# Patient Record
Sex: FEMALE | Race: WHITE | NOT HISPANIC OR LATINO | Employment: FULL TIME | URBAN - METROPOLITAN AREA
[De-identification: names, ages, dates, MRNs, and addresses within clinical notes are randomized per-mention and may not be internally consistent; named-entity substitution may affect disease eponyms.]

---

## 2018-06-25 ENCOUNTER — RECORDS - HEALTHEAST (OUTPATIENT)
Dept: LAB | Facility: CLINIC | Age: 18
End: 2018-06-25

## 2018-06-25 LAB
25(OH)D3 SERPL-MCNC: 23.8 NG/ML (ref 30–80)
VIT B12 SERPL-MCNC: 304 PG/ML (ref 213–816)

## 2018-06-26 LAB — C TRACH DNA SPEC QL PROBE+SIG AMP: NEGATIVE

## 2019-09-18 ENCOUNTER — RECORDS - HEALTHEAST (OUTPATIENT)
Dept: LAB | Facility: CLINIC | Age: 19
End: 2019-09-18

## 2019-09-18 LAB
CHOLEST SERPL-MCNC: 121 MG/DL
FASTING STATUS PATIENT QL REPORTED: NORMAL
HDLC SERPL-MCNC: 51 MG/DL
LDLC SERPL CALC-MCNC: 59 MG/DL
TRIGL SERPL-MCNC: 56 MG/DL

## 2019-09-19 LAB
25(OH)D3 SERPL-MCNC: 24.2 NG/ML (ref 30–80)
BKR LAB AP ABNORMAL BLEEDING: NO
BKR LAB AP BIRTH CONTROL/HORMONES: NORMAL
BKR LAB AP CERVICAL APPEARANCE: NORMAL
BKR LAB AP GYN ADEQUACY: NORMAL
BKR LAB AP GYN INTERPRETATION: NORMAL
BKR LAB AP HPV REFLEX: NORMAL
BKR LAB AP LMP: NORMAL
BKR LAB AP PATIENT STATUS: NORMAL
BKR LAB AP PREVIOUS ABNORMAL: NORMAL
BKR LAB AP PREVIOUS NORMAL: NORMAL
C TRACH DNA SPEC QL PROBE+SIG AMP: NEGATIVE
HIGH RISK?: NO
PATH REPORT.COMMENTS IMP SPEC: NORMAL
RESULT FLAG (HE HISTORICAL CONVERSION): NORMAL

## 2020-05-11 ENCOUNTER — VIRTUAL VISIT (OUTPATIENT)
Dept: FAMILY MEDICINE | Facility: OTHER | Age: 20
End: 2020-05-11

## 2020-05-12 NOTE — PROGRESS NOTES
"Date: 2020 09:39:30  Clinician: Gulilaume Gomez  Clinician NPI: [id number]  Patient: Samaria Hernandez  Patient : 2000  Patient Address: [address] [place] [address]  Patient Phone: [phone]  Visit Protocol: URI  Patient Summary:  Samaria is a 19 year old ( : 2000 ) female who initiated a Visit for COVID-19 (Coronavirus) evaluation and screening. When asked the question \"Please sign me up to receive news, health information and promotions. \", Samaria responded \"No\".    Samaria states her symptoms started gradually 3-4 days ago.   Her symptoms consist of myalgia, rhinitis, nasal congestion, ageusia, anosmia, and a headache.   Symptom details     Nasal secretions: The color of her mucus is yellow.    Headache: She states the headache is mild (1-3 on a 10 point pain scale).      Samaria denies having fever, facial pain or pressure, sore throat, cough, vomiting, nausea, teeth pain, diarrhea, ear pain, malaise, wheezing, enlarged lymph nodes, and chills. She also denies taking antibiotic medication for the symptoms, having a sinus infection within the past year, double sickening (worsening symptoms after initial improvement), and having recent facial or sinus surgery in the past 60 days. She is not experiencing dyspnea.   Precipitating events  She has not recently been exposed to someone with influenza. Samaria has been in close contact with the following high risk individuals: adults 65 or older and children under the age of 5.   Pertinent COVID-19 (Coronavirus) information  Samaria does not work or volunteer as healthcare worker or a  and does not work or volunteer in a healthcare facility.   She does not live with a healthcare worker.   Samaria has not had a close contact with a laboratory-confirmed COVID-19 patient within 14 days of symptom onset. She also has not had a close contact with a suspected COVID-19 patient within 14 days of symptom onset.   Pertinent medical history  Samaria does not get yeast " infections when she takes antibiotics.   Samaria does not need a return to work/school note.   Weight: 170 lbs   Samaria does not smoke or use smokeless tobacco.   She denies pregnancy and denies breastfeeding. She is currently menstruating.   Weight: 170 lbs    MEDICATIONS: No current medications, ALLERGIES: NKDA  Clinician Response:  Dear Samaria,   Dear Samaria  Your symptoms show that you may have coronavirus (COVID-19). This illness can cause fever, cough and trouble breathing. Many people get a mild case and get better on their own. Some people can get very sick.   Will I be tested for COVID-19?  Because we have limited testing supplies, we do not test everyone who is at low risk. We are testing if:    You are very ill. For example, you're on chemotherapy, dialysis or home hospice care. (Contact your specialty clinic or program.)   You live in a nursing home or other long-term care facility. (Talk to your nurse manager or medical director.)   You're a health care worker. ([facility] employees contact our employee health office for testing.)   We are performing limited curbside testing for healthcare/first responders and people with medical problems that put them at increased risk. It does not appear by the [facility] information you submitted that you meet any of these criteria. If there are medical problems that we did not know about, please repeat an [facility] visit and let us know what medical conditions you have.   How can I protect others?  Without a test, we can't know for sure that you have COVID-19. For safety, it's very important to follow these rules.  First, stay home and away from others (self-isolate) until:   You've had no fever---and no medicine that reduces fever---for 3 full days (72 hours). And...    Your other symptoms have gotten better. For example, your cough or breathing has improved. And...   At least 10 days have passed since your symptoms started.   During this time:   Don't go to work, school or  anywhere else.    Stay away from others in your home. No hugging, kissing or shaking hands.   Don't let anyone visit.   Cover your mouth and nose with a mask, tissue or wash cloth to avoid spreading germs.   Wash your hands and face often. Use soap and water.   How can I take care of myself?   1.Take Tylenol (acetaminophen) for fever or pain. If you have liver or kidney problems, ask your family doctor if it's okay to take Tylenol.        Adults can take either:    650 mg (two 325 mg pills) every 4 to 6 hours, or...   1,000 mg (two 500 mg pills) every 8 hours as needed.    Note: Don't take more than 3,000 mg in one day.   For children, check the Tylenol bottle for the right dose. The dose is based on the child's age or weight.   2.If you have other health problems (like cancer, heart failure, an organ transplant or severe kidney disease): Call your specialty clinic if you don't feel better in the next 2 days.       3.Know when to call 911: If your breathing is so bad that it keeps you from doing normal activities, call 911 or go to the emergency room. Tell them that you've been staying home and may have COVID-19.   Where can I get more information?  To learn more about COVID-19 and how to care for yourself at home, please visit the CDC website at https://www.cdc.gov/coronavirus/2019-ncov/about/steps-when-sick.html.  For more about your care at [facility], please visit [email or web address]   If you are interested in becoming part of a [facility] clinic trial related to COVID19 please go to [email or web address] for information, if you qualify.     Diagnosis: Acute upper respiratory infection, unspecified  Diagnosis ICD: J06.9

## 2021-03-01 LAB
ABO (EXTERNAL): NORMAL
BLD GP AB SCN SERPL QL: NEGATIVE
BLD GP AB SCN SERPL QL: NEGATIVE
HEMOGLOBIN: 14.5 G/DL
HEPATITIS B SURFACE ANTIGEN (EXTERNAL): NONREACTIVE
HIV1+2 AB SERPL QL IA: NONREACTIVE
PLATELET # BLD AUTO: 254 K/UL
PLATELET COUNT (EXTERNAL): 254 10E3/UL (ref 140–400)
RH (EXTERNAL): POSITIVE
RUBELLA ANTIBODY IGG (EXTERNAL): NORMAL
TREPONEMA PALLIDUM ANTIBODY (EXTERNAL): NONREACTIVE

## 2021-08-16 LAB — GROUP B STREPTOCOCCUS (EXTERNAL): POSITIVE

## 2021-08-17 ENCOUNTER — TRANSFERRED RECORDS (OUTPATIENT)
Dept: HEALTH INFORMATION MANAGEMENT | Facility: CLINIC | Age: 21
End: 2021-08-17

## 2021-09-03 ENCOUNTER — HOSPITAL ENCOUNTER (INPATIENT)
Facility: HOSPITAL | Age: 21
LOS: 3 days | Discharge: HOME | End: 2021-09-07
Attending: FAMILY MEDICINE | Admitting: FAMILY MEDICINE

## 2021-09-03 ENCOUNTER — HOSPITAL ENCOUNTER (OUTPATIENT)
Facility: HOSPITAL | Age: 21
Discharge: HOME | End: 2021-09-03
Attending: FAMILY MEDICINE | Admitting: FAMILY MEDICINE

## 2021-09-03 LAB — SARS-COV-2 RNA RESP QL NAA+PROBE: NEGATIVE

## 2021-09-03 PROCEDURE — 250N000013 HC RX MED GY IP 250 OP 250 PS 637: Performed by: FAMILY MEDICINE

## 2021-09-03 PROCEDURE — 87635 SARS-COV-2 COVID-19 AMP PRB: CPT | Performed by: FAMILY MEDICINE

## 2021-09-03 RX ORDER — MISOPROSTOL 100 UG/1
25 TABLET ORAL
Status: COMPLETED | OUTPATIENT
Start: 2021-09-03 | End: 2021-09-04

## 2021-09-03 RX ORDER — MORPHINE SULFATE 10 MG/ML
15 INJECTION, SOLUTION INTRAMUSCULAR; INTRAVENOUS
Status: DISCONTINUED | OUTPATIENT
Start: 2021-09-03 | End: 2021-09-06

## 2021-09-03 RX ORDER — VITAMIN B COMPLEX
1 TABLET ORAL DAILY
COMMUNITY

## 2021-09-03 RX ORDER — PRENATAL VIT/IRON FUM/FOLIC AC 27MG-0.8MG
1 TABLET ORAL DAILY
COMMUNITY

## 2021-09-03 RX ORDER — HYDROXYZINE HYDROCHLORIDE 50 MG/1
50 TABLET, FILM COATED ORAL
Status: DISCONTINUED | OUTPATIENT
Start: 2021-09-03 | End: 2021-09-06

## 2021-09-03 RX ADMIN — MISOPROSTOL 25 MCG: 100 TABLET ORAL at 23:05

## 2021-09-03 RX ADMIN — MISOPROSTOL 25 MCG: 100 TABLET ORAL at 21:11

## 2021-09-03 ASSESSMENT — MIFFLIN-ST. JEOR: SCORE: 1769.66

## 2021-09-03 NOTE — PROGRESS NOTES
Pt here for a covid test to be completed. Sample obtained and sent at this time. Samaria is to return this eventing for a scheduled induction.

## 2021-09-04 PROBLEM — O26.643 CHOLESTASIS DURING PREGNANCY IN THIRD TRIMESTER: Chronic | Status: ACTIVE | Noted: 2021-09-04

## 2021-09-04 PROBLEM — Z34.90 ENCOUNTER FOR INDUCTION OF LABOR: Status: ACTIVE | Noted: 2021-09-04

## 2021-09-04 LAB
ABO/RH(D): NORMAL
ANTIBODY SCREEN: NEGATIVE
HOLD SPECIMEN: NORMAL
HOLD SPECIMEN: NORMAL
SPECIMEN EXPIRATION DATE: NORMAL

## 2021-09-04 PROCEDURE — 250N000013 HC RX MED GY IP 250 OP 250 PS 637: Performed by: FAMILY MEDICINE

## 2021-09-04 PROCEDURE — 36415 COLL VENOUS BLD VENIPUNCTURE: CPT | Performed by: FAMILY MEDICINE

## 2021-09-04 PROCEDURE — 120N000001 HC R&B MED SURG/OB

## 2021-09-04 PROCEDURE — 86901 BLOOD TYPING SEROLOGIC RH(D): CPT | Performed by: FAMILY MEDICINE

## 2021-09-04 RX ORDER — NALOXONE HYDROCHLORIDE 0.4 MG/ML
0.2 INJECTION, SOLUTION INTRAMUSCULAR; INTRAVENOUS; SUBCUTANEOUS
Status: DISCONTINUED | OUTPATIENT
Start: 2021-09-04 | End: 2021-09-06 | Stop reason: HOSPADM

## 2021-09-04 RX ORDER — PROCHLORPERAZINE MALEATE 10 MG
10 TABLET ORAL EVERY 6 HOURS PRN
Status: DISCONTINUED | OUTPATIENT
Start: 2021-09-04 | End: 2021-09-06 | Stop reason: HOSPADM

## 2021-09-04 RX ORDER — OXYTOCIN/0.9 % SODIUM CHLORIDE 30/500 ML
100-340 PLASTIC BAG, INJECTION (ML) INTRAVENOUS CONTINUOUS PRN
Status: DISCONTINUED | OUTPATIENT
Start: 2021-09-04 | End: 2021-09-07 | Stop reason: HOSPADM

## 2021-09-04 RX ORDER — METHYLERGONOVINE MALEATE 0.2 MG/ML
200 INJECTION INTRAVENOUS
Status: DISCONTINUED | OUTPATIENT
Start: 2021-09-04 | End: 2021-09-06 | Stop reason: HOSPADM

## 2021-09-04 RX ORDER — METOCLOPRAMIDE 10 MG/1
10 TABLET ORAL EVERY 6 HOURS PRN
Status: DISCONTINUED | OUTPATIENT
Start: 2021-09-04 | End: 2021-09-06 | Stop reason: HOSPADM

## 2021-09-04 RX ORDER — KETOROLAC TROMETHAMINE 30 MG/ML
30 INJECTION, SOLUTION INTRAMUSCULAR; INTRAVENOUS
Status: DISCONTINUED | OUTPATIENT
Start: 2021-09-04 | End: 2021-09-07 | Stop reason: HOSPADM

## 2021-09-04 RX ORDER — FENTANYL CITRATE 50 UG/ML
50-100 INJECTION, SOLUTION INTRAMUSCULAR; INTRAVENOUS
Status: DISCONTINUED | OUTPATIENT
Start: 2021-09-04 | End: 2021-09-06 | Stop reason: HOSPADM

## 2021-09-04 RX ORDER — OXYTOCIN 10 [USP'U]/ML
10 INJECTION, SOLUTION INTRAMUSCULAR; INTRAVENOUS
Status: DISCONTINUED | OUTPATIENT
Start: 2021-09-04 | End: 2021-09-07 | Stop reason: HOSPADM

## 2021-09-04 RX ORDER — IBUPROFEN 600 MG/1
600 TABLET, FILM COATED ORAL
Status: DISCONTINUED | OUTPATIENT
Start: 2021-09-04 | End: 2021-09-07 | Stop reason: HOSPADM

## 2021-09-04 RX ORDER — MISOPROSTOL 200 UG/1
800 TABLET ORAL
Status: DISCONTINUED | OUTPATIENT
Start: 2021-09-04 | End: 2021-09-06 | Stop reason: HOSPADM

## 2021-09-04 RX ORDER — OXYTOCIN 10 [USP'U]/ML
10 INJECTION, SOLUTION INTRAMUSCULAR; INTRAVENOUS
Status: DISCONTINUED | OUTPATIENT
Start: 2021-09-04 | End: 2021-09-06 | Stop reason: HOSPADM

## 2021-09-04 RX ORDER — ONDANSETRON 2 MG/ML
4 INJECTION INTRAMUSCULAR; INTRAVENOUS EVERY 6 HOURS PRN
Status: DISCONTINUED | OUTPATIENT
Start: 2021-09-04 | End: 2021-09-06 | Stop reason: HOSPADM

## 2021-09-04 RX ORDER — NALOXONE HYDROCHLORIDE 0.4 MG/ML
0.4 INJECTION, SOLUTION INTRAMUSCULAR; INTRAVENOUS; SUBCUTANEOUS
Status: DISCONTINUED | OUTPATIENT
Start: 2021-09-04 | End: 2021-09-06 | Stop reason: HOSPADM

## 2021-09-04 RX ORDER — OXYTOCIN/0.9 % SODIUM CHLORIDE 30/500 ML
340 PLASTIC BAG, INJECTION (ML) INTRAVENOUS CONTINUOUS PRN
Status: DISCONTINUED | OUTPATIENT
Start: 2021-09-04 | End: 2021-09-06 | Stop reason: HOSPADM

## 2021-09-04 RX ORDER — ONDANSETRON 4 MG/1
4 TABLET, ORALLY DISINTEGRATING ORAL EVERY 6 HOURS PRN
Status: DISCONTINUED | OUTPATIENT
Start: 2021-09-04 | End: 2021-09-06 | Stop reason: HOSPADM

## 2021-09-04 RX ORDER — MISOPROSTOL 200 UG/1
400 TABLET ORAL
Status: DISCONTINUED | OUTPATIENT
Start: 2021-09-04 | End: 2021-09-06 | Stop reason: HOSPADM

## 2021-09-04 RX ORDER — PENICILLIN G 3000000 [IU]/50ML
3 INJECTION, SOLUTION INTRAVENOUS EVERY 4 HOURS
Status: DISCONTINUED | OUTPATIENT
Start: 2021-09-04 | End: 2021-09-06 | Stop reason: HOSPADM

## 2021-09-04 RX ORDER — CARBOPROST TROMETHAMINE 250 UG/ML
250 INJECTION, SOLUTION INTRAMUSCULAR
Status: DISCONTINUED | OUTPATIENT
Start: 2021-09-04 | End: 2021-09-06 | Stop reason: HOSPADM

## 2021-09-04 RX ORDER — METOCLOPRAMIDE HYDROCHLORIDE 5 MG/ML
10 INJECTION INTRAMUSCULAR; INTRAVENOUS EVERY 6 HOURS PRN
Status: DISCONTINUED | OUTPATIENT
Start: 2021-09-04 | End: 2021-09-06 | Stop reason: HOSPADM

## 2021-09-04 RX ORDER — PROCHLORPERAZINE 25 MG
25 SUPPOSITORY, RECTAL RECTAL EVERY 12 HOURS PRN
Status: DISCONTINUED | OUTPATIENT
Start: 2021-09-04 | End: 2021-09-06 | Stop reason: HOSPADM

## 2021-09-04 RX ORDER — ACETAMINOPHEN 325 MG/1
650 TABLET ORAL EVERY 4 HOURS PRN
Status: DISCONTINUED | OUTPATIENT
Start: 2021-09-04 | End: 2021-09-06 | Stop reason: HOSPADM

## 2021-09-04 RX ORDER — PENICILLIN G POTASSIUM 5000000 [IU]/1
5 INJECTION, POWDER, FOR SOLUTION INTRAMUSCULAR; INTRAVENOUS ONCE
Status: COMPLETED | OUTPATIENT
Start: 2021-09-04 | End: 2021-09-06

## 2021-09-04 RX ORDER — URSODIOL 300 MG/1
300 CAPSULE ORAL 2 TIMES DAILY
Status: ON HOLD | COMMUNITY
End: 2021-09-07

## 2021-09-04 RX ADMIN — MISOPROSTOL 25 MCG: 100 TABLET ORAL at 07:02

## 2021-09-04 RX ADMIN — MISOPROSTOL 25 MCG: 100 TABLET ORAL at 19:43

## 2021-09-04 RX ADMIN — MISOPROSTOL 25 MCG: 100 TABLET ORAL at 13:26

## 2021-09-04 RX ADMIN — MISOPROSTOL 25 MCG: 100 TABLET ORAL at 05:02

## 2021-09-04 RX ADMIN — MISOPROSTOL 25 MCG: 100 TABLET ORAL at 21:44

## 2021-09-04 RX ADMIN — MISOPROSTOL 25 MCG: 100 TABLET ORAL at 03:09

## 2021-09-04 RX ADMIN — HYDROXYZINE HYDROCHLORIDE 50 MG: 50 TABLET ORAL at 03:09

## 2021-09-04 RX ADMIN — MISOPROSTOL 25 MCG: 100 TABLET ORAL at 09:14

## 2021-09-04 RX ADMIN — MISOPROSTOL 25 MCG: 100 TABLET ORAL at 01:06

## 2021-09-04 RX ADMIN — MISOPROSTOL 25 MCG: 100 TABLET ORAL at 23:47

## 2021-09-04 RX ADMIN — MISOPROSTOL 25 MCG: 100 TABLET ORAL at 11:27

## 2021-09-04 RX ADMIN — HYDROXYZINE HYDROCHLORIDE 50 MG: 50 TABLET ORAL at 21:45

## 2021-09-04 ASSESSMENT — ENCOUNTER SYMPTOMS: ROS SKIN COMMENTS: ITCHING

## 2021-09-04 NOTE — PLAN OF CARE
[place] [facility] [place] due to cholestasis. SVE 1/30/-2. Cat I tracing with contractions q2-6 mins. Patient states they are not painful but feel like johan willett. Dr Rodríguez called regarding patient arrival and information above. Orders received for cervical ripening.

## 2021-09-04 NOTE — H&P
"Samaria Lowe is an 21 year old female, ,  presents for induction at 39w0d with prenatal care through Roselia Hensley MD.  Cervix was 1 cm dilated upon arrival. She has had cramping and some bloody show since Cytotec was started for cervical ripening last night around 9 pm.    Prenatal flowsheet and labs were reviewed.      Her prenatal course was complicated by cholestasis diagnosed at 37w1d with mildly elevated bile acids (13 umol/L) which were checked due to onset of intense itching.  She has taken ursodiol for relief.  Last bile acids level was 12 umol/L at 38w1d on 21.  Liver panel was normal except mild elevated alkaline phosphatase.    She is GBS positive.    History reviewed. No pertinent past medical history.    Allergies:   Allergies   Allergen Reactions     Thimerosal Rash       Active Problems:    Encounter for induction of labor    Cholestasis during pregnancy in third trimester    Blood pressure 128/60, pulse 88, temperature 98.4  F (36.9  C), temperature source Oral, resp. rate 18, height 1.676 m (5' 6\"), weight 99.8 kg (220 lb), SpO2 98 %, currently breastfeeding.    Review of Systems   Skin:        itching            Physical Exam  Constitutional:       Appearance: Normal appearance.   HENT:      Head: Normocephalic and atraumatic.      Nose: Nose normal.      Mouth/Throat:      Mouth: Mucous membranes are moist.   Cardiovascular:      Rate and Rhythm: Normal rate. Rhythm irregular.      Pulses: Normal pulses.      Heart sounds: Normal heart sounds.   Pulmonary:      Effort: Pulmonary effort is normal.      Breath sounds: Normal breath sounds.   Abdominal:      Comments: Gravid, cephalic position of fetus by palpation   Genitourinary:     General: Normal vulva.      Comments: Cervix 1/60%/-1/mid position/medium consistency  Musculoskeletal:      Cervical back: Normal range of motion and neck supple.   Skin:     General: Skin is warm and dry.   Neurological:      Mental Status: " She is alert and oriented to person, place, and time.   Psychiatric:         Mood and Affect: Mood normal.         Behavior: Behavior normal.      FHT's:  Category 1 tracing  [place]: q 1.5 - 2 minutes      Assessment:  Active Problems:    Encounter for induction of labor    Cholestasis during pregnancy in third trimester        Plan: Continue cervical ripening.        Deb Rodríguez MD  9/4/2021

## 2021-09-04 NOTE — PLAN OF CARE
Problem: Delayed Labor Progression (Labor)  Goal: Effective Progression to Delivery  Outcome: Improving     RN discussed plan for Cytotec induction with Samaria and  Faraz. All questions answered.

## 2021-09-05 PROBLEM — O99.820 GBS (GROUP B STREPTOCOCCUS CARRIER), +RV CULTURE, CURRENTLY PREGNANT: Chronic | Status: ACTIVE | Noted: 2021-09-05

## 2021-09-05 PROBLEM — R00.0 TACHYCARDIA: Status: ACTIVE | Noted: 2021-09-05

## 2021-09-05 PROCEDURE — 3E0P7VZ INTRODUCTION OF HORMONE INTO FEMALE REPRODUCTIVE, VIA NATURAL OR ARTIFICIAL OPENING: ICD-10-PCS | Performed by: FAMILY MEDICINE

## 2021-09-05 PROCEDURE — 120N000001 HC R&B MED SURG/OB

## 2021-09-05 PROCEDURE — 250N000013 HC RX MED GY IP 250 OP 250 PS 637: Performed by: FAMILY MEDICINE

## 2021-09-05 RX ORDER — MISOPROSTOL 100 UG/1
25 TABLET ORAL
Status: DISCONTINUED | OUTPATIENT
Start: 2021-09-05 | End: 2021-09-06

## 2021-09-05 RX ADMIN — MISOPROSTOL 25 MCG: 100 TABLET ORAL at 22:45

## 2021-09-05 RX ADMIN — DINOPROSTONE 10 MG: 10 INSERT VAGINAL at 02:09

## 2021-09-05 RX ADMIN — HYDROXYZINE HYDROCHLORIDE 50 MG: 50 TABLET ORAL at 20:50

## 2021-09-05 RX ADMIN — MISOPROSTOL 25 MCG: 100 TABLET ORAL at 18:42

## 2021-09-05 RX ADMIN — MISOPROSTOL 25 MCG: 100 TABLET ORAL at 20:50

## 2021-09-05 NOTE — PROGRESS NOTES
"OB ANTEPARTUM PROGRESS NOTE    IUP at 39w1d, admitted for IOL/Cervical ripening for cholestasis    SUBJECTIVE:  Patient feels fine but tired- she has been undergoing cervical ripening, most recently cervidil. She is not yet in labor.  Request for placement of Cook Catheter for mechanical cervical ripening.     OBJECTIVE:  /67   Pulse 88   Temp 98.4  F (36.9  C) (Oral)   Resp 16   Ht 1.676 m (5' 6\")   Wt 99.8 kg (220 lb)   SpO2 98%   Breastfeeding Yes   BMI 35.51 kg/m     Abd: gravid  NST: Category 1  CX: 1+/50%/soft/mid-post/-2/intact    Cook catheter placed and 60 ml placed into uterine balloon and 40 ml into vaginal balloon, with intention to add 20 to each balloon in about 30 min.   Pt tolerated procedure well    ASSESSMENT:  Cholestasis  Term Pregnancy  Need for cervical ripening    PLAN:  Cook Catheter with option to add PO cytotec    Yasmin Adam M.D., FACOG  [facility]   OFFICE: [phone]  "

## 2021-09-05 NOTE — PLAN OF CARE
Dr Rodríguez updated on patient status. Two doses of Cytotec held this afternoon due to tachy- stole. Contractions have now spaced after oral hydration and a tub bath. Plan to continue with the full course of Cytotec and perform SVE two hours after last dose. Plan discussed with Gisela and all questions answered.

## 2021-09-05 NOTE — PROGRESS NOTES
cervadil removed at 1410 as ordered. SVE as noted with minimal change. Dr Staples notified, plans to have Huber Catheter placed but would like [facility] to assess and place. Dr Bernabe was given [facility] pager number. MD did call into pt's room and discuss plan of care with her and she was agreeable. Placement will take occur after pt showers.

## 2021-09-05 NOTE — PROGRESS NOTES
Pt is now requesting that the Cook cath to be removed and/or pain medication. Dr Bernabe notified, will call into pt room to discuss options with her.

## 2021-09-05 NOTE — PROGRESS NOTES
"Day #2 Cervical ripening for induction of labor   Indication for induction:  Cholestasis of pregnancy  Gestational Age:  39w1d     SUBJECTIVE: She continues to have cramping, but overall is still comfortable. Nurse reports episodes of maternal tachycardia intermittent into 130's.    OBJECTIVE:   BP 99/68   Pulse 109   Temp 98.4  F (36.9  C) (Oral)   Resp 16   Ht 1.676 m (5' 6\")   Wt 99.8 kg (220 lb)   SpO2 98%   Breastfeeding Yes   BMI 35.51 kg/m     Alert, no apparent distress  EFM:  Category 1 150's  [place]: q 1.5 to 4 minutes  Maternal pulse 100's to 130's    ASSESSMENT:  Active Problems:    Encounter for induction of labor    Cholestasis during pregnancy in third trimester    Tachycardia    GBS (group B Streptococcus carrier), +RV culture, currently pregnant    PLAN:   Continue Cervidil protocol, then switch to Pitocin.  Continue to hold GBS prophylaxis until ROM or in labor.  Mild tachycardia - monitor and get EKG if rate 140's or higher.  "

## 2021-09-06 ENCOUNTER — ANESTHESIA (OUTPATIENT)
Dept: OBGYN | Facility: HOSPITAL | Age: 21
End: 2021-09-06

## 2021-09-06 ENCOUNTER — ANESTHESIA EVENT (OUTPATIENT)
Dept: OBGYN | Facility: HOSPITAL | Age: 21
End: 2021-09-06

## 2021-09-06 PROCEDURE — 258N000003 HC RX IP 258 OP 636: Performed by: FAMILY MEDICINE

## 2021-09-06 PROCEDURE — 250N000011 HC RX IP 250 OP 636: Performed by: ANESTHESIOLOGY

## 2021-09-06 PROCEDURE — 0HQ9XZZ REPAIR PERINEUM SKIN, EXTERNAL APPROACH: ICD-10-PCS | Performed by: FAMILY MEDICINE

## 2021-09-06 PROCEDURE — 3E0R3BZ INTRODUCTION OF ANESTHETIC AGENT INTO SPINAL CANAL, PERCUTANEOUS APPROACH: ICD-10-PCS | Performed by: ANESTHESIOLOGY

## 2021-09-06 PROCEDURE — 370N000003 HC ANESTHESIA WARD SERVICE

## 2021-09-06 PROCEDURE — 250N000009 HC RX 250: Performed by: FAMILY MEDICINE

## 2021-09-06 PROCEDURE — 00HU33Z INSERTION OF INFUSION DEVICE INTO SPINAL CANAL, PERCUTANEOUS APPROACH: ICD-10-PCS | Performed by: ANESTHESIOLOGY

## 2021-09-06 PROCEDURE — 120N000001 HC R&B MED SURG/OB

## 2021-09-06 PROCEDURE — 250N000013 HC RX MED GY IP 250 OP 250 PS 637: Performed by: FAMILY MEDICINE

## 2021-09-06 PROCEDURE — 250N000009 HC RX 250: Performed by: ANESTHESIOLOGY

## 2021-09-06 PROCEDURE — 722N000001 HC LABOR CARE VAGINAL DELIVERY SINGLE

## 2021-09-06 PROCEDURE — 250N000011 HC RX IP 250 OP 636: Performed by: FAMILY MEDICINE

## 2021-09-06 PROCEDURE — 250N000009 HC RX 250

## 2021-09-06 RX ORDER — LIDOCAINE HYDROCHLORIDE 20 MG/ML
SOLUTION OROPHARYNGEAL
Status: COMPLETED
Start: 2021-09-06 | End: 2021-09-06

## 2021-09-06 RX ORDER — MISOPROSTOL 200 UG/1
TABLET ORAL
Status: DISPENSED
Start: 2021-09-06 | End: 2021-09-07

## 2021-09-06 RX ORDER — ACETAMINOPHEN 325 MG/1
650 TABLET ORAL EVERY 4 HOURS PRN
Status: DISCONTINUED | OUTPATIENT
Start: 2021-09-06 | End: 2021-09-07 | Stop reason: HOSPADM

## 2021-09-06 RX ORDER — CARBOPROST TROMETHAMINE 250 UG/ML
250 INJECTION, SOLUTION INTRAMUSCULAR
Status: DISCONTINUED | OUTPATIENT
Start: 2021-09-06 | End: 2021-09-07 | Stop reason: HOSPADM

## 2021-09-06 RX ORDER — NALBUPHINE HYDROCHLORIDE 10 MG/ML
2.5-5 INJECTION, SOLUTION INTRAMUSCULAR; INTRAVENOUS; SUBCUTANEOUS EVERY 6 HOURS PRN
Status: DISCONTINUED | OUTPATIENT
Start: 2021-09-06 | End: 2021-09-07 | Stop reason: HOSPADM

## 2021-09-06 RX ORDER — OXYTOCIN/0.9 % SODIUM CHLORIDE 30/500 ML
340 PLASTIC BAG, INJECTION (ML) INTRAVENOUS CONTINUOUS PRN
Status: DISCONTINUED | OUTPATIENT
Start: 2021-09-06 | End: 2021-09-07 | Stop reason: HOSPADM

## 2021-09-06 RX ORDER — METHYLERGONOVINE MALEATE 0.2 MG/ML
200 INJECTION INTRAVENOUS
Status: DISCONTINUED | OUTPATIENT
Start: 2021-09-06 | End: 2021-09-07 | Stop reason: HOSPADM

## 2021-09-06 RX ORDER — BISACODYL 10 MG
10 SUPPOSITORY, RECTAL RECTAL DAILY PRN
Status: DISCONTINUED | OUTPATIENT
Start: 2021-09-06 | End: 2021-09-07 | Stop reason: HOSPADM

## 2021-09-06 RX ORDER — OXYTOCIN 10 [USP'U]/ML
10 INJECTION, SOLUTION INTRAMUSCULAR; INTRAVENOUS
Status: DISCONTINUED | OUTPATIENT
Start: 2021-09-06 | End: 2021-09-07 | Stop reason: HOSPADM

## 2021-09-06 RX ORDER — MISOPROSTOL 200 UG/1
400 TABLET ORAL
Status: DISCONTINUED | OUTPATIENT
Start: 2021-09-06 | End: 2021-09-07 | Stop reason: HOSPADM

## 2021-09-06 RX ORDER — LIDOCAINE HCL/EPINEPHRINE/PF 2%-1:200K
VIAL (ML) INJECTION PRN
Status: DISCONTINUED | OUTPATIENT
Start: 2021-09-06 | End: 2021-09-06

## 2021-09-06 RX ORDER — IBUPROFEN 800 MG/1
800 TABLET, FILM COATED ORAL EVERY 6 HOURS PRN
Status: DISCONTINUED | OUTPATIENT
Start: 2021-09-06 | End: 2021-09-07 | Stop reason: HOSPADM

## 2021-09-06 RX ORDER — LIDOCAINE HYDROCHLORIDE 10 MG/ML
INJECTION, SOLUTION EPIDURAL; INFILTRATION; INTRACAUDAL; PERINEURAL
Status: COMPLETED
Start: 2021-09-06 | End: 2021-09-06

## 2021-09-06 RX ORDER — EPHEDRINE SULFATE 50 MG/ML
5 INJECTION, SOLUTION INTRAMUSCULAR; INTRAVENOUS; SUBCUTANEOUS
Status: DISCONTINUED | OUTPATIENT
Start: 2021-09-06 | End: 2021-09-06 | Stop reason: HOSPADM

## 2021-09-06 RX ORDER — LIDOCAINE HYDROCHLORIDE AND EPINEPHRINE 15; 5 MG/ML; UG/ML
INJECTION, SOLUTION EPIDURAL PRN
Status: DISCONTINUED | OUTPATIENT
Start: 2021-09-06 | End: 2021-09-06

## 2021-09-06 RX ORDER — DOCUSATE SODIUM 100 MG/1
100 CAPSULE, LIQUID FILLED ORAL DAILY
Status: DISCONTINUED | OUTPATIENT
Start: 2021-09-06 | End: 2021-09-07 | Stop reason: HOSPADM

## 2021-09-06 RX ORDER — ONDANSETRON 4 MG/1
4 TABLET, ORALLY DISINTEGRATING ORAL EVERY 6 HOURS PRN
Status: DISCONTINUED | OUTPATIENT
Start: 2021-09-06 | End: 2021-09-06 | Stop reason: HOSPADM

## 2021-09-06 RX ORDER — MODIFIED LANOLIN
OINTMENT (GRAM) TOPICAL
Status: DISCONTINUED | OUTPATIENT
Start: 2021-09-06 | End: 2021-09-07 | Stop reason: HOSPADM

## 2021-09-06 RX ORDER — MISOPROSTOL 200 UG/1
800 TABLET ORAL
Status: DISCONTINUED | OUTPATIENT
Start: 2021-09-06 | End: 2021-09-07 | Stop reason: HOSPADM

## 2021-09-06 RX ORDER — OXYTOCIN 10 [USP'U]/ML
INJECTION, SOLUTION INTRAMUSCULAR; INTRAVENOUS
Status: DISCONTINUED
Start: 2021-09-06 | End: 2021-09-06 | Stop reason: WASHOUT

## 2021-09-06 RX ORDER — HYDROCORTISONE 2.5 %
CREAM (GRAM) TOPICAL 3 TIMES DAILY PRN
Status: DISCONTINUED | OUTPATIENT
Start: 2021-09-06 | End: 2021-09-07 | Stop reason: HOSPADM

## 2021-09-06 RX ORDER — ONDANSETRON 2 MG/ML
4 INJECTION INTRAMUSCULAR; INTRAVENOUS EVERY 6 HOURS PRN
Status: DISCONTINUED | OUTPATIENT
Start: 2021-09-06 | End: 2021-09-06 | Stop reason: HOSPADM

## 2021-09-06 RX ADMIN — ONDANSETRON 4 MG: 2 INJECTION INTRAMUSCULAR; INTRAVENOUS at 16:07

## 2021-09-06 RX ADMIN — Medication 340 ML/HR: at 18:06

## 2021-09-06 RX ADMIN — KETOROLAC TROMETHAMINE 30 MG: 30 INJECTION, SOLUTION INTRAMUSCULAR at 18:05

## 2021-09-06 RX ADMIN — Medication: at 20:12

## 2021-09-06 RX ADMIN — LIDOCAINE HYDROCHLORIDE 20 ML: 10 INJECTION, SOLUTION EPIDURAL; INFILTRATION; INTRACAUDAL; PERINEURAL at 17:59

## 2021-09-06 RX ADMIN — ACETAMINOPHEN 650 MG: 325 TABLET ORAL at 10:36

## 2021-09-06 RX ADMIN — DOCUSATE SODIUM 100 MG: 100 CAPSULE, LIQUID FILLED ORAL at 20:13

## 2021-09-06 RX ADMIN — MISOPROSTOL 25 MCG: 100 TABLET ORAL at 12:06

## 2021-09-06 RX ADMIN — MISOPROSTOL 25 MCG: 100 TABLET ORAL at 05:05

## 2021-09-06 RX ADMIN — MISOPROSTOL 25 MCG: 100 TABLET ORAL at 07:04

## 2021-09-06 RX ADMIN — Medication: at 14:27

## 2021-09-06 RX ADMIN — PENICILLIN G 3 MILLION UNITS: 3000000 INJECTION, SOLUTION INTRAVENOUS at 17:27

## 2021-09-06 RX ADMIN — MISOPROSTOL 25 MCG: 100 TABLET ORAL at 02:48

## 2021-09-06 RX ADMIN — LIDOCAINE HYDROCHLORIDE,EPINEPHRINE BITARTRATE 9 ML: 20; .005 INJECTION, SOLUTION EPIDURAL; INFILTRATION; INTRACAUDAL; PERINEURAL at 14:44

## 2021-09-06 RX ADMIN — SODIUM CHLORIDE, POTASSIUM CHLORIDE, SODIUM LACTATE AND CALCIUM CHLORIDE 1000 ML: 600; 310; 30; 20 INJECTION, SOLUTION INTRAVENOUS at 13:51

## 2021-09-06 RX ADMIN — PENICILLIN G POTASSIUM 5 MILLION UNITS: 5000000 POWDER, FOR SOLUTION INTRAMUSCULAR; INTRAPLEURAL; INTRATHECAL; INTRAVENOUS at 13:52

## 2021-09-06 RX ADMIN — MISOPROSTOL 25 MCG: 100 TABLET ORAL at 09:04

## 2021-09-06 RX ADMIN — LIDOCAINE HYDROCHLORIDE 15 ML: 20 SOLUTION ORAL; TOPICAL at 17:59

## 2021-09-06 RX ADMIN — MISOPROSTOL 25 MCG: 100 TABLET ORAL at 00:45

## 2021-09-06 RX ADMIN — LIDOCAINE HYDROCHLORIDE,EPINEPHRINE BITARTRATE 3 ML: 15; .005 INJECTION, SOLUTION EPIDURAL; INFILTRATION; INTRACAUDAL; PERINEURAL at 14:40

## 2021-09-06 RX ADMIN — WITCH HAZEL: 500 SOLUTION RECTAL; TOPICAL at 20:12

## 2021-09-06 NOTE — ANESTHESIA PROCEDURE NOTES
Epidural catheter Procedure Note  Pre-Procedure   Staff -        Anesthesiologist:  Ranjith Yoon MD       Performed By: anesthesiologist       Location: OB       Procedure Start/Stop Times: 9/6/2021 2:19 PM and 9/6/2021 2:48 PM       Pre-Anesthestic Checklist: patient identified, IV checked, risks and benefits discussed, informed consent, monitors and equipment checked and pre-op evaluation  Timeout:       Correct Patient: Yes        Correct Procedure: Yes        Correct Site: Yes        Correct Position: Yes   Procedure Documentation  Procedure: epidural catheter       Patient Position: sitting       Patient Prep/Sterile Barriers: sterile gloves, mask, patient draped       Skin prep: Chloraprep      Local skin infiltrated with mL of 1% lidocaine.        Insertion Site: L3-4. (midline approach).       Technique: LORT saline        LIZ at 8 cm.       Needle Gauge: 18.        Needle Length (Inches): 3.5        Catheter: 20 G.         Catheter threaded easily.         5 cm epidural space.         Threaded 13 cm at skin.         # of attempts: 2 and  # of redirects:  2    Assessment/Narrative         Paresthesias: No.     Test dose of mL lidocaine 1.5% w/ 1:200,000 epinephrine at.         Test dose negative, 3 minutes after injection, for signs of intravascular, subdural, or intrathecal injection.       Insertion/Infusion Method: LORT saline       Aspiration negative for Heme or CSF via Epidural Catheter.

## 2021-09-06 NOTE — PROGRESS NOTES
Pt offered cervical exam given ctx and very light vaginal bleeding. Pt declined, prefers to wait until MD rounds today. Pt also declined the saline lock I offered at 1900; she preferes to wait until active labor.

## 2021-09-06 NOTE — PROGRESS NOTES
Summary of pt cares since I took over at 1900 (late note due to acuity on unit). Samaria taking misoprostol for cervical ripening per orders. Fetus active and category I tracing, however have had to adjust frequently and tracing at times discontinuous  Due to patient moving, eating, visiting BR. Pt had light bloody show that has since dissipated. MD Adam was notified of this per pt request. Aqua K pad set up for pt comfort, atarax given, pt appears to be comfortably sleeping.

## 2021-09-06 NOTE — L&D DELIVERY NOTE
OB Vaginal Delivery Note    Samaria Lowe MRN# [id number]   Age: 21 year old YOB: 2000       GA: 39w2d  GP:   Labor Complications: None   EBL:   mL  Delivery QBL: 250 mL  Delivery Type: Vaginal, Spontaneous   ROM to Delivery Time: (Delivered) Hours: 1 Minutes: 5  [place] Weight:     1 Minute 5 Minute 10 Minute   Apgar Totals:            RESHMA LARA;BEAR LANDAVERDE;FELIPE MCDONALD;DAMIEN MASON     Delivery Details:  Samaria Lowe, a 21 year old  female delivered a viable infant with apgars of   and  . Patient was fully dilated and pushing after   hours   minutes in active labor. Delivery was via vaginal, spontaneous  to a sterile field under epidural;local  anesthesia. Infant delivered in   middle  occiput  anterior  position. Anterior and posterior shoulders delivered without difficulty. The cord was clamped, cut twice and 3 vessels  were noted. Cord blood was obtained in routine fashion with the following disposition: lab .      Cord complications: nuchal   Placenta delivered at 2021  5:57 PM . Placental disposition was Hospital disposal . Fundal massage performed and fundus found to be firm.     Episiotomy: none    Perineum, vagina, cervix were inspected, and the following lacerations were noted:   Perineal lacerations: 1st                Any lacerations were repaired in the usual fashion using 3-0 Vicryl.    Excellent hemostasis was noted. Needle count correct. Infant and patient in delivery room in good and stable condition.        [place] Female-Samaria [[id number]]    Labor Event Times    Labor onset date: 21 Onset time:  7:00 AM   Dilation complete date: 21 Complete time:  5:07 PM   Start pushing date/time: 2021 1710      Labor Events     labor?: No   steroids: None  Labor Type: Induction/Cervical ripening  Predominate monitoring during 1st stage: continuous electronic fetal monitoring     Antibiotics received during labor?: Yes  Reason for  Antibiotics: GBS  Antibiotics received for GBS: Penicillin  Antibiotics Given (GBS): Greater than 4 hours prior to delivery     Rupture date/time: 21 1646   Rupture type: Spontaneous rupture of membranes occuring during spontaneous labor or augmentation  Fluid color: Meconium  Fluid odor: Normal     Induction: Misoprostol, Cervidil, Mechanical ripening agent  Induction date/time:     Cervical ripening date/time: 9/3/21 2000   Indications for induction: Elective     Augmentation: None  1:1 continuous labor support provided by?: RN Labor partogram used?: no      Delivery/Placenta Date and Time    Delivery Date: 21 Delivery Time:  5:51 PM   Placenta Date/Time: 2021  5:57 PM  Oxytocin given at the time of delivery: after delivery of placenta  Delivering clinician: Deb Rodríguez MD   Other personnel present at delivery:  Provider Role   Christine Jose RN Registered Nurse   Katie Miles RN Registered Nurse   Jeff, Susan JOSEPH RN Registered Nurse   [place], Cheryl Nurse Practitioner         Vaginal Counts     Initial count performed by 2 team members:  Two Team Members   Dr Anne Jose RN       [place] Suture Needles Sponges (RETIRED) Instruments   Initial counts 1 1 5    Added to count       Relief counts       Final counts 1 1 5          Placed during labor Accounted for at the end of labor   FSE No NA   IUPC No NA   Cervadil Yes Yes              Final count performed by 2 team members:  Two Team Members   Dr Anne Jose RN      Final count correct?: Yes     Cord    Vessels: 3 Vessels    Cord Complications: Nuchal   Nuchal Intervention: delivered through         Nuchal cord description: tight nuchal cord         Cord Blood Disposition: Lab    Gases Sent?: No    Delayed cord clamping?: Yes    Cord Clamping Delay (seconds):  seconds    Stem cell collection?: No       [place] Resuscitation    Methods: None     Labor Events and Shoulder Dystocia    Fetal Tracing Prior to  Delivery: Category 1  Shoulder dystocia present?: Neg     Delivery (Maternal) (Provider to Complete) ([id number])    Episiotomy: None  Perineal lacerations: 1st Repaired?: Yes   Repair suture: 3-0 Vicryl  Number of repair packets: 1  Genital tract inspection done: Pos     Blood Loss  Mother: Samaria Lowe #[id number]   Start of Mother's Information    Delivery Blood Loss  09/06/21 0700 - 09/06/21 1853    Delivery QBL (mL) Hospital Encounter 250 mL    Total  250 mL         End of Mother's Information  Mother: Samaria Lowe #[id number]          Delivery - Provider to Complete ([id number])    Delivering clinician: Deb Rodríguez MD  Delivery Type (Choose the 1 that will go to the Birth History): Vaginal, Spontaneous                   Other personnel:  Provider Role   Christine Jose RN Registered Nurse   Katie Miles RN Registered Nurse   Susan Aguilar RN Registered Nurse   Cheryl Mcgowan Nurse Practitioner                Placenta    Date/Time: 9/6/2021  5:57 PM  Removal: Spontaneous  Disposition: Hospital disposal           Anesthesia    Method: Epidural, Local                Presentation and Position    Position: Middle Occiput Anterior                 Deb Rodríguez MD

## 2021-09-06 NOTE — PLAN OF CARE
Cat I EFM tracing though discontinuous at times due to pt movement. Pt restful and sleeping comfortable with atarax and aqua K pad. Continuing with cervical ripening after pt was unable to tolerate cook catheter  Problem: Change in Fetal Wellbeing (Labor)  Goal: Stable Fetal Wellbeing  Outcome: No Change

## 2021-09-06 NOTE — ANESTHESIA PREPROCEDURE EVALUATION
Anesthesia Pre-Procedure Evaluation    Patient: Samaria Lowe   MRN: [id number] : 2000        Preoperative Diagnosis: * No pre-op diagnosis entered *   Procedure : * No procedures listed *     History reviewed. No pertinent past medical history.   History reviewed. No pertinent surgical history.   Allergies   Allergen Reactions     Thimerosal Rash      Social History     Tobacco Use     Smoking status: Never Smoker     Smokeless tobacco: Never Used   Substance Use Topics     Alcohol use: Not Currently      Wt Readings from Last 1 Encounters:   21 99.8 kg (220 lb)        Anesthesia Evaluation   Pt has not had prior anesthetic         ROS/MED HX  ENT/Pulmonary:       Neurologic:  - neg neurologic ROS     Cardiovascular:    (-) PIH   METS/Exercise Tolerance:     Hematologic: Comments: Cholestasis during pregnancy, mild.      Musculoskeletal:       GI/Hepatic:  - neg GI/hepatic ROS     Renal/Genitourinary:       Endo:     (+) Obesity,     Psychiatric/Substance Use:  - neg psychiatric ROS     Infectious Disease:       Malignancy:       Other:     (-) previous  and TOLAC candidate       Physical Exam    Airway        Mallampati: III    Neck ROM: full     Respiratory Devices and Support         Dental           Cardiovascular             Pulmonary                   OUTSIDE LABS:  CBC:   Lab Results   Component Value Date    HGB 14.5 2021     BMP: No results found for: NA, POTASSIUM, CHLORIDE, CO2, BUN, CR, GLC  COAGS: No results found for: PTT, INR, FIBR  POC: No results found for: BGM, HCG, HCGS  HEPATIC: No results found for: ALBUMIN, PROTTOTAL, ALT, AST, GGT, ALKPHOS, BILITOTAL, BILIDIRECT, PARKER  OTHER: No results found for: PH, LACT, A1C, ALIYA, PHOS, MAG, LIPASE, AMYLASE, TSH, T4, T3, CRP, SED    Anesthesia Plan    ASA Status:  3      Anesthesia Type: Epidural.              Consents    Anesthesia Plan(s) and associated risks, benefits, and realistic alternatives discussed. Questions  answered and patient/representative(s) expressed understanding.     - Discussed with:  Patient         Postoperative Care            Comments:    ALTAGRACIA Yoon MD

## 2021-09-07 VITALS
HEIGHT: 66 IN | HEART RATE: 88 BPM | RESPIRATION RATE: 16 BRPM | DIASTOLIC BLOOD PRESSURE: 64 MMHG | SYSTOLIC BLOOD PRESSURE: 118 MMHG | TEMPERATURE: 98.2 F | WEIGHT: 220 LBS | OXYGEN SATURATION: 96 % | BODY MASS INDEX: 35.36 KG/M2

## 2021-09-07 PROBLEM — R00.0 TACHYCARDIA: Status: RESOLVED | Noted: 2021-09-05 | Resolved: 2021-09-07

## 2021-09-07 PROBLEM — O26.643 CHOLESTASIS DURING PREGNANCY IN THIRD TRIMESTER: Chronic | Status: RESOLVED | Noted: 2021-09-04 | Resolved: 2021-09-07

## 2021-09-07 PROBLEM — Z34.90 ENCOUNTER FOR INDUCTION OF LABOR: Status: RESOLVED | Noted: 2021-09-04 | Resolved: 2021-09-07

## 2021-09-07 PROBLEM — O99.820 GBS (GROUP B STREPTOCOCCUS CARRIER), +RV CULTURE, CURRENTLY PREGNANT: Chronic | Status: RESOLVED | Noted: 2021-09-05 | Resolved: 2021-09-07

## 2021-09-07 PROCEDURE — 250N000013 HC RX MED GY IP 250 OP 250 PS 637: Performed by: FAMILY MEDICINE

## 2021-09-07 RX ORDER — IBUPROFEN 200 MG
800 TABLET ORAL EVERY 6 HOURS PRN
Status: ON HOLD | COMMUNITY
Start: 2021-09-07 | End: 2023-05-26

## 2021-09-07 RX ORDER — ACETAMINOPHEN 325 MG/1
650 TABLET ORAL EVERY 4 HOURS PRN
COMMUNITY
Start: 2021-09-07

## 2021-09-07 RX ADMIN — Medication: at 00:50

## 2021-09-07 RX ADMIN — IBUPROFEN 800 MG: 800 TABLET, FILM COATED ORAL at 00:50

## 2021-09-07 RX ADMIN — WITCH HAZEL 1 EACH: 500 SOLUTION RECTAL; TOPICAL at 18:15

## 2021-09-07 RX ADMIN — IBUPROFEN 800 MG: 800 TABLET, FILM COATED ORAL at 13:21

## 2021-09-07 RX ADMIN — Medication 1 APPLICATOR: at 18:15

## 2021-09-07 RX ADMIN — ACETAMINOPHEN 650 MG: 325 TABLET ORAL at 06:52

## 2021-09-07 RX ADMIN — IBUPROFEN 800 MG: 800 TABLET, FILM COATED ORAL at 06:51

## 2021-09-07 RX ADMIN — ACETAMINOPHEN 650 MG: 325 TABLET ORAL at 00:50

## 2021-09-07 NOTE — LACTATION NOTE
This note was copied from a baby's chart.  Lactation to patient room to assess breastfeeding per patient request. Mom is using cross cradle hold and states nipple pain 3/10 with initial latch bilaterally but pain subsides. Compression stripe on R nipple noted after feeding; assisted with asymmetrical latch on L side and nipple not creased after feeding. Instructed on use and care of gel pads for nipple pain; mom states understanding.

## 2021-09-07 NOTE — DISCHARGE SUMMARY
[facility]    Discharge Summary  Obstetrics    Date of Admission:  9/3/2021  Date of Discharge:  2021   Discharging Provider: Deb Rodríguez    Discharge Diagnoses   Principal Problem:    Normal delivery  Resolved Problems:    Encounter for induction of labor    Cholestasis during pregnancy in third trimester    Tachycardia    GBS (group B Streptococcus carrier), +RV culture, currently pregnant        History of Present Illness   Samaria Lowe is a 21 year old female who presented with a plan to induce labor for cholestasis of pregnancy.    Hospital Course   The patient's hospital course was remarkable for a long course of cervical ripening with 12 doses then 2 dose of cervidil, a brief trial of mechanical dilation with a Cook catheter, then 9 more doses of Cervidil. She then had a normal labor and delivery with an epidural. She recovered as anticipated and experienced no post-delivery complications.  On discharge, her pain was well controlled. Vaginal bleeding is similar to peak menstrual flow.  Voiding without difficulty.  Ambulating well and tolerating a normal diet.  No fevers.  Breastfeeding well.  Infant is stable.  She was discharged on post-partum day #1..    Post-partum hemoglobin:   Hemoglobin   Date Value Ref Range Status   2021 14.5 11.7 - 15.5 g/dL Final       [place] Depression Scale  Thoughts of Harming Self: 0-->never  Total Score: 5      Deb Rodríguez MD    Discharge Disposition   Discharged to home   Condition at discharge: Good    Primary Care Physician   ASHLEIGH LEONARD    Consultations This Hospital Stay   OB GYN IP CONSULT  LACTATION IP CONSULT  HOME CARE POST PARTUM/ IP CONSULT  LACTATION IP CONSULT    Discharge Orders      Lactation Referral      Activity    Activity as tolerated     Reason for your hospital stay    Maternity care     Follow Up    Follow up with Ashleigh Leonard MD in 6 weeks for post-delivery check     Breast pump  - Manual/Electric    Breast Pump Documentation:  Manual/Electric Pump: To support adequate breast milk production and nutrition for infant.     I, the undersigned, certify that the above prescribed supplies are medically necessary for this patient and is both reasonable and necessary in reference to accepted standards of medical and necessary in reference to accepted standards of medical practice in the treatment of this patient's condition and is not prescribed as a convenience.     Breast Pump DME    Breast Pump Documentation:   Manual/Electric Pump: To support adequate breast milk production and nutrition for infant.     I, the undersigned, certify that the above prescribed supplies are medically necessary for this patient and is both reasonable and necessary in reference to accepted standards of medical and necessary in reference to accepted standards of medical practice in the treatment of this patient's condition and is not prescribed as a convenience.     Diet    Resume previous diet     Discharge Medications   Current Discharge Medication List      START taking these medications    Details   acetaminophen (TYLENOL) 325 MG tablet Take 2 tablets (650 mg) by mouth every 4 hours as needed for mild pain or fever (greater than or equal to 38  C /100.4  F (oral) or 38.5  C/ 101.4  F (core).)    Associated Diagnoses: Normal delivery      ibuprofen (ADVIL/MOTRIN) 200 MG tablet Take 4 tablets (800 mg) by mouth every 6 hours as needed for other (cramping)    Associated Diagnoses: Normal delivery         CONTINUE these medications which have NOT CHANGED    Details   Prenatal Vit-Fe Fumarate-FA (PRENATAL MULTIVITAMIN W/IRON) 27-0.8 MG tablet Take 1 tablet by mouth daily      Vitamin D3 (CHOLECALCIFEROL) 25 mcg (1000 units) tablet Take 1 tablet by mouth daily         STOP taking these medications       ursodiol (ACTIGALL) 300 MG capsule Comments:   Reason for Stopping:             Allergies   Allergies   Allergen  Reactions     Thimerosal Rash

## 2021-09-07 NOTE — PROGRESS NOTES
"Postpartum Progress Note:  Vaginal delivery  Day 1    SUBJECTIVE: Samaria Lowe is a 21 year old female  who   is feeling well.  Pain is well controlled. She is eating, ambulating and voiding without difficulty.  She is breast feeding.  She has no concerns. Baby is well.    OBJECTIVE:   /63   Pulse 98   Temp 97.9  F (36.6  C) (Oral)   Resp 16   Ht 1.676 m (5' 6\")   Wt 99.8 kg (220 lb)   SpO2 96%   Breastfeeding Yes   BMI 35.51 kg/m      Alert no apparent distress   Fundus is firm 2 fingerbreadths below umbilicus  Extremities: edema trace     Lab Results   Component Value Date    HGB 14.5 2021         ASSESSMENT:    Principal Problem:    Normal delivery  Resolved Problems:    Encounter for induction of labor    Cholestasis during pregnancy in third trimester    Tachycardia    GBS (group B Streptococcus carrier), +RV culture, currently pregnant        Condition:  good    PLAN:    Continue current care.  Anticipate discharge this evening.    Deb Rodríguez MD  2021 2:16 PM     "

## 2021-09-07 NOTE — PLAN OF CARE
Problem: Adjustment to Role Transition (Postpartum Vaginal Delivery)  Goal: Successful Maternal Role Transition  Outcome: Improving  Intervention: Support Maternal Role Transition     Problem: Bleeding (Postpartum Vaginal Delivery)  Goal: Hemostasis  Outcome: Improving     Problem: Infection (Postpartum Vaginal Delivery)  Goal: Absence of Infection Signs and Symptoms  Outcome: Improving     Problem: Pain (Postpartum Vaginal Delivery)  Goal: Acceptable Pain Control  Outcome: Improving  Intervention: Prevent or Manage Pain     Problem: Urinary Retention (Postpartum Vaginal Delivery)  Goal: Effective Urinary Elimination  Outcome: Improving    Patient's vital signs are within normal limits. Patient ambulates independently and participates in infant cares and feeds. Patient states pain is adequately controlled with PRN Motrin and Tylenol.

## 2021-09-07 NOTE — PLAN OF CARE
Problem: Infection (Postpartum Vaginal Delivery)  Goal: Absence of Infection Signs and Symptoms  Outcome: No Change     Problem: Pain (Postpartum Vaginal Delivery)  Goal: Acceptable Pain Control  Outcome: No Change     Problem: Urinary Retention (Postpartum Vaginal Delivery)  Goal: Effective Urinary Elimination  Outcome: No Change   Patient is taking PRN ibuprofen for perineum pain with reported good relief.  Patient also encouraged to soak in tub 1-2 times a day.  Tucks pads and benzocaine spray applied to bottom area independently.  Will continue to monitor and assist as needed.

## 2021-09-07 NOTE — CONSULTS
Lactation consultant to patient room to assess breastfeeding per consult request.    Reviewed benefit of skin to skin prior to feeding to help get baby ready for feeding, importance of feeding baby on early hunger cues, and breastfeeding 8-12 times in 24 hours for optimal infant nutrition and hydration as well as for building an optimal milk supply.  Education given regarding importance of optimal positioning for deep, comfortable latch and effective milk transfer.     Mom has been nursing in cradle hold position, so gave hands on help with cross cradle hold. Infant rhythmically sucking and swallowing occasionally on L breast. Instructed on  breast compression and other strategies to keep baby awake and busy at the breast.    Physician to room to assess mom, so encouraged mom to finish feeding on R breast, and call me to return for next feeding to assess as well as answer questions. Mom states understanding.

## 2021-09-07 NOTE — ANESTHESIA POSTPROCEDURE EVALUATION
Patient: Samaria E Capistrant    * No procedures listed *    Diagnosis:* No pre-op diagnosis entered *  Diagnosis Additional Information: No value filed.    Anesthesia Type:  No value filed.    Note:  Disposition: Inpatient   Postop Pain Control: Uneventful            Sign Out: Well controlled pain   PONV:    Neuro/Psych: Uneventful            Sign Out: Acceptable/Baseline neuro status   Airway/Respiratory: Uneventful            Sign Out: Acceptable/Baseline resp. status   CV/Hemodynamics: Uneventful            Sign Out: Acceptable CV status; No obvious hypovolemia; No obvious fluid overload   Other NRE: NONE   DID A NON-ROUTINE EVENT OCCUR? No    Event details/Postop Comments:  LE worked great!!  Delivered shortly after LE placement.  No complaints.           Last vitals:  Vitals Value Taken Time   BP     Temp     Pulse     Resp     SpO2         Electronically Signed By: Ranjith Yoon MD  September 7, 2021  7:01 AM

## 2021-09-07 NOTE — PROGRESS NOTES
"Outreach Note for [facility]    Samaria Lowe  [id number]  2000    Chart reviewed, discharge follow-up plan discussed with patient's bedside RN, needs assessed. If able, patient, Samaria, would like to discharge this evening after  24hr testing. Post-delivery check appointment with  planned in 6 weeks at [facility], patient will schedule as instructed. Samaria is reported to have support at home, has baby care essentials, and feels ready to discharge later today with , \"Sabrina Mera\".      No additional needs identified at this time. Outreach nurse will continue to follow and assist with discharge planning as needed.           "

## 2021-09-27 NOTE — PROGRESS NOTES
"Assessment:   1.  Three week old infant with slow weight gain:  3 oz below birthweight, but gaining well since last visit  2.  Good latch and suck but low milk transfer in office today--in need of continued supplementation  3.  Mother with low milk supply, indeterminate cause  4.  Mother with depression, interested in therapy    Plan:   1.  Use good positioning for deep latch, with baby held close to body and baby's head/shoulders/hips in good alignment.  When in a seated position, use a pillow to help bring baby close to breasts, and stepstool to elevate your knees above hips.   2.  Present breast in the \"sandwich\" hold, compressing breast vertically and in line with baby's mouth, for baby to get a larger mouthful of breast and a deeper latch.   3.  Sabrina needs about 22 oz of milk each day to grow well and catch up on her growth.  If she nurses at home as she did in the office today, about 10 times/day, she needs about 14 oz per day in supplementation, using your breastmilk as your first choice and formula when the supply of pumped milk runs out.  You can give this after feedings, or distributed throughout the day according to her feeding cues.  4.   When giving bottles, use the paced bottlefeeding method.  You could also use a tube at the breast for supplementing if you like.  4.  You can continue pumping as you have been, using a little heat and massage on your breasts as you pump to help bring more milk.  You can also use a little coconut oil or olive oil inside your pump flanges if it helps with comfort.  5.  You can add some dietary supplements for yourself if you like;  Jhonny has some research behind it as being helpful for increasing milk supply.  Given written information.  Discussed potential option of Reglan, although Saamria shares she has had some depression in the past, so likely not a good option for her.  6. Know that given breastfeeding challenges, although some families pursue all avenues and " "some wean to formula entirely, there are many other options for coping. These can include decreasing pumping efforts and using formula for supplementation, setting a time boundary on continuing to \"triple feed,\" or moving to exclusive pumping.  Breastfeeding does not need to be a black-and-white choice, and you can consider what works best for your family.    7.  Formula has cow's milk protein in it just like your milk, so your milk is a better choice than formula in that regard.  You can continue to eliminate dairy to see if Sabrina does better, or you could add some back and see how she is.  Most of the time milk intolerance results in vomiting, skin problems and/or blood in the stool.  8.  If you decide to stop breastfeeding and use formula, decrease your pumping slowly so that breasts do not become engorged.  Discussed how to drop back gradually.  9. Discussed depression--given Post[place] Support [place] resource, which has list of therapists.  Reassured that if medication is needed, there are options that are safe in breastfeeding.  10.  See Dr. Hensley as planned, and lactation as needed.        Subjective: Samaria is here today because of poor weight gain in baby Sabrina and concerns about low milk supply--reports that baby was found to be significantly more than 10% loss at last week's pediatric visit, so was advised to add pumping and supplementation with formula.  Noticed swallowing in first few days, but less so lately.  Does note since beginning supplementation that baby is calmer and more awake.  Has been supplementing with formula rather than breastmilk as she is concerned about cow's protein sensitivity--baby had some mucousy stools.  Denies any intense fussiness, eczema, blood in stools, more than usual spit-up.  Has tried to decrease dairy in diet but has not been able to entirely eliminate, so unsure about offering the milk she has pumped.   Finally, baby often pulls off and becomes restless at breast, " "so Samaria is wondering if she may have overactive letdown.  Has been nursing baby just 2-3 times/day, and mostly feeding via formula.     Hospital Course: Induced for cholestasis of pregnancy, with cervical ripening x 2 1/2 days followed by rapid active labor and uncomplicated birth. Seen by hospital IBCLC for routine support.    Mother's Relevant Med/Surg History: Cholestasis of pregnancy    Breast Surgery: none    Breastfeeding Goals: uncertain--considering weaning to formula if breastfeeding difficulties continue    Previous Breastfeeding Experience: first baby    Infant's name: Sabrina  Infant's bday: [birth year]  Gestational age: 39w2d  Infant's birth weight: 7 # 14.6 oz     Mode of delivery: vaginal  Pediatric Provider: Dr. Hensley.  Samaria gives her permission for today's note to be forwarded to Dr. Hensley.  JAY signed and filed in Samaria's chart as Sabrina has no local active pediatric chart.    Discharge weight: 7 # 9.9 oz  Recent weight at ped provider 9/23/21:  7 # 2 oz    Frequency and duration of feedings:   Swallows audible per mother: was yes  Numbers of feedings in 24 hours: 8-12  Number urines per day: 8  Number of stools per day and their color: 3, yellow seedy mucousy    Supplementation: with 3 oz of formula every 3 hours  Pumping: about every three hours, yielding 1 1/2 oz    Objective/Physical exam:   Mother: Noticed breasts grew larger and areolas darkened during pregnancy and she noticed some fullness when her milk came in on day 3-4    Her nipples are everted, the areola is compressible, the breast is soft and full.  Normally spaced breasts.    Sore nipples: no  EPDS: 11, with \"hardly ever\" marked for thoughts of self-harm.  On discussion, Samaria denies any active thoughts of self or infant harm. Shares that she is interested in mental health therapy.    Assessment of infant: 18.83% Weight for age percentile   Age today: 3 w 1d  Today's weight: 7# 10.2 oz  Amount of milk transferred from LEFT side: 0.6 " oz  Amount of milk transferred from RIGHT side: 0.2 oz    Baby has full flexion of arms and legs, normal tone, behavior is alert and active, respirations are normal, skin is normal, hydration is normal, jaw is normal size and alignment, palate is normal, frenulum is normal, baby can lateralize tongue, has adequate tongue lift, and tongue can protrude past bottom gum line.    Suck exam:  Baby has strong, coordinated suck with good tongue cupping    Baby thrush: none   Jaundice: none     Feeding assessment: Baby can hold suction with tongue while at the breast.     Alignment: The baby was flex relaxed. Baby's head was aligned with its trunk. Baby did face mother. Baby was in cross cradle position today.     Areolar Grasp: Baby was able to open mouth wide. Baby's lips were not pursed. Baby's lips did flange outward. Tongue was visible over bottom gum. Baby had complete seal.     Areolar Compression: Baby made rhythmic motion. There were no clicking or smacking sounds. There was no severe nipple discomfort. Nipples appeared rounded after feeding.    Audible swallowing: Baby made some quiet sounds of swallowing: There was an increase in frequency after milk ejection reflex. The milk ejection reflex is normal and milk supply is low.     /76 (BP Location: Right arm, Patient Position: Sitting, Cuff Size: Adult Regular)   Pulse 107   SpO2 97%   OB History    Para Term  AB Living   1 1 1 0 0 1   SAB TAB Ectopic Multiple Live Births   0 0 0 0 1      # Outcome Date GA Lbr Mars/2nd Weight Sex Delivery Anes PTL Lv   1 Term 21 39w2d 10:07 / 00:44 3.59 kg (7 lb 14.6 oz) F Vag-Spont EPI, Local N [id number]      Name: CAPISTRANT,FEMALE-LEOBARDO      Apgar1: 8  Apgar5: 9       Current Outpatient Medications:      acetaminophen (TYLENOL) 325 MG tablet, Take 2 tablets (650 mg) by mouth every 4 hours as needed for mild pain or fever (greater than or equal to 38  C /100.4  F (oral) or 38.5  C/ 101.4  F (core).), Disp: ,  Rfl:      ibuprofen (ADVIL/MOTRIN) 200 MG tablet, Take 4 tablets (800 mg) by mouth every 6 hours as needed for other (cramping), Disp: , Rfl:      Prenatal Vit-Fe Fumarate-FA (PRENATAL MULTIVITAMIN W/IRON) 27-0.8 MG tablet, Take 1 tablet by mouth daily, Disp: , Rfl:      Vitamin D3 (CHOLECALCIFEROL) 25 mcg (1000 units) tablet, Take 1 tablet by mouth daily, Disp: , Rfl:   No past medical history on file.  No past surgical history on file.  No family history on file.    Time spent:  Chart review/prechartin min prior to day of service  Face-to-face visit:   57 min   Documentation:  12 min   Total time spent on day of service: 69 min    SUKHDEV Marcelo, CNM, IBCLC

## 2021-09-28 ENCOUNTER — ALLIED HEALTH/NURSE VISIT (OUTPATIENT)
Dept: MIDWIFE SERVICES | Facility: CLINIC | Age: 21
End: 2021-09-28

## 2021-09-28 VITALS — OXYGEN SATURATION: 97 % | DIASTOLIC BLOOD PRESSURE: 76 MMHG | HEART RATE: 107 BPM | SYSTOLIC BLOOD PRESSURE: 112 MMHG

## 2021-09-28 DIAGNOSIS — O92.79 INSUFFICIENT LACTATION: Primary | ICD-10-CM

## 2021-09-28 PROCEDURE — 99205 OFFICE O/P NEW HI 60 MIN: CPT | Performed by: ADVANCED PRACTICE MIDWIFE

## 2021-09-28 ASSESSMENT — EDINBURGH POSTNATAL DEPRESSION SCALE (EPDS)
I HAVE LOOKED FORWARD WITH ENJOYMENT TO THINGS: AS MUCH AS I EVER DID
I HAVE FELT SAD OR MISERABLE: YES, QUITE OFTEN
THINGS HAVE BEEN GETTING ON TOP OF ME: NO, MOST OF THE TIME I HAVE COPED QUITE WELL
I HAVE BEEN SO UNHAPPY THAT I HAVE HAD DIFFICULTY SLEEPING: NOT VERY OFTEN
I HAVE BEEN SO UNHAPPY THAT I HAVE BEEN CRYING: ONLY OCCASIONALLY
I HAVE BEEN ABLE TO LAUGH AND SEE THE FUNNY SIDE OF THINGS: AS MUCH AS I ALWAYS COULD
I HAVE BEEN ANXIOUS OR WORRIED FOR NO GOOD REASON: YES, SOMETIMES
I HAVE FELT SCARED OR PANICKY FOR NO GOOD REASON: NO, NOT MUCH
THE THOUGHT OF HARMING MYSELF HAS OCCURRED TO ME: HARDLY EVER
TOTAL SCORE: 11
I HAVE BLAMED MYSELF UNNECESSARILY WHEN THINGS WENT WRONG: YES, SOME OF THE TIME

## 2021-09-28 NOTE — PATIENT INSTRUCTIONS
"  1.  Use good positioning for deep latch, with baby held close to body and baby's head/shoulders/hips in good alignment.  When in a seated position, use a pillow to help bring baby close to breasts, and stepstool to elevate your knees above hips.   2.  Present breast in the \"sandwich\" hold, compressing breast vertically and in line with baby's mouth, for baby to get a larger mouthful of breast and a deeper latch.   3.  Sabrina needs about 22 oz of milk each day to grow well and catch up on her growth.  If she nurses at home as she did in the office today, about 10 times/day, she needs about 14 oz per day in supplementation, using your breastmilk as your first choice and formula when the supply of pumped milk runs out.  You can give this after feedings, or distributed throughout the day according to her feeding cues.  4.   When giving bottles, use the paced bottlefeeding method.  You could also use a tube at the breast for supplementing if you like.  4.  You can continue pumping as you have been, using a little heat and massage on your breasts as you pump.   You can also use a little coconut oil or olive oil inside your pump flanges if it helps with comfort.  5.  You can add some dietary supplements for yourself if you like;  Jhonny has some research behind it as being helpful for increasing milk supply.    6. Know that given breastfeeding challenges, although some families pursue all avenues and some wean to formula entirely, there are many other options for coping. These can include decreasing pumping efforts and using formula for supplementation, setting a time boundary on continuing to \"triple feed,\" or moving to exclusive pumping.  Breastfeeding does not need to be a black-and-white choice, and you can consider what works best for your family.    7.  Formula has cow's milk protein in it just like your milk, so your milk is a better choice than formula in that regard.  You can continue to eliminate dairy to " see if Sabrina does better, or you could add some back and see how she is.  Most of the time milk intolerance results in vomiting, skin problems and/or blood in the stool.  8.  See Dr. Hensley as planned, and lactation as needed.      _________      Regarding hospital grade pump:      1.  Call insurance company to check coverage and if any additional information is needed.  Ask if insurance will cover hospital grade pump, which is pump type     2.  Ask if you can get this pump from [employer], and if not, which medical equipment company you need to use    3.  Ask if you need a prescription or letter of medical necessity.  If so, ask the insurance company to send the form to our clinic and we can fill it out.    Cost for pump rental is around $88/month from [employer] if it is not covered by insurance, plus $60 for the tubing set (if you do not already have a Medela pump)    (Cost if rented from [facility] is about $63/month;  From [facility] is about $70/month)        -------------------------------------------------------------------------------------------------  Information for breastfeeding families on Increasing breastmilk supply     Frequent stimulation of the breasts, by breastfeeding or by using a breast pump, during the first few days and weeks, is essential to establish an abundant breastmilk supply. If you find your milk supply is low, try the following recommendations. If you are consistent you will likely see an improvement within a few days. Although it may take a month or more to bring your supply up to meet your baby's needs, you will see steady, gradual improvement. You will be glad that you put the time and effort into breastfeeding and so will your baby.     More breast stimulation:  the most important thing!  --Breastfeed more often, at least 8-12 times per 24 hours.   --Discontinue the use of a pacifier (so that when the  "baby wants to suck, they are stimulating the breasts for milk production)  --Try to get in \"one more feeding\" before you go to sleep, even if you have to wake the baby.  --Offer both breasts at each feeding  --\"Switch nursing:\" using each breast twice or three times in a feeding, and using different positions  --\"Top up feeds\" give a short feeding in 10-20 minutes if baby seems hungry  -- Remove milk well by massaging breasts while the baby is feeding  --Try breast compression - pushing milk to baby during a feeding    Avoid these things that are known to reduce breastmilk supply  --Smoking  --Caffeine  --Birth control pills and injections  --Decongestants, antihistamines  --Severe weight loss diets  --Mints, parsley, vira in excessive amounts    Use a breast pump  --Consider use of a hospital grade breast pump with a double kit  --Pump after feedings or between feedings.  Remember that shorter, more frequent pumping sessions are more helpful than longer sessions that happen less frequently.  Anytime you can squeeze a little time in is helpful!  --Rest 10-15 minutes prior to pumping, eat and drink something.  Be nice to yourself!  During this time trying applying warmth to your breasts and massaging them gently before beginning to pump  --Do hand expression after pumping. This can help bring out more milk, as well as offer extra breast stimulation.  --Try \"power pumping\" for 2 or 3 days. Pumping 12 x a day after feedings, even for a short time. Or, try an hour of pumping for 10min, resting for 10 min, then pumping for another 10 min, etc.,  for a few times a day.     Condition your let-down reflex  --Play relaxing music  --Imagine your baby, look at pictures of your baby, smell baby clothing or baby powder  --Watch videos of your baby  --Alternatively, if thinking about your baby and their need for milk is stressful instead of relaxing, do something completely different!  Call a friend, play a game, listen to a " "podcast or a meditation  --Always pump in the same quiet, relaxed place, set up a routine  --Do slow, deep, relaxed breathing, relax your shoulders    Mother care  --Reduce stress and activity, get help  --Increase fluid intake, but just to thirst.  More water doesn't magically turn into more milk!  --Eat nutritious meals, continue to take prenatal vitamins  --Back rubs stimulate nerves that serve the breasts (central part of the spine)  --Increase skin-to-skin holding time with your baby, relax together  --Take a warm, bath, read, meditate, and empty your mind of tasks that need to be done    Herbs, food and supplements   --These may offer help to some women, but frequent milk removal helps much more!  Supplements are not a substitute.  --Salgado's yeast: 3 Tablespoons daily, increase by 1/2 teaspoon daily until results are seen  --Moringa (also called malunggay):  this is a leaf that is commonly eaten in [place], and has been shown in a number of studies to increase milk supply.  In [place] it is found in powder form, often sold in capsules.  It is sold under a number of brand names. A common dose is 250-350 mg three times daily.  --Sources for moringa/malunggay for helping with milk supply:    Brands containing or comprised of moringa:  Go-Lacta  Motherlove Herbal Tincture  Simply Herbal Organics \"Adoptive Lactation Milk-in\"  [employer]atur[employer] \"Milk A-Plenty\" ([place])  Milkapalooza or Cash Cow by Legendairy  Lush Leche or Milk Machine by Euphoric Herbal  (Or can purchase just moringa itself from [employer] or [employer])     --Fenugreek:  Doses of 3-5 capsules (580-610 mg each) three times per day are commonly recommended. Avoid fenugreek if you are diabetic, hypoglycemic, asthmatic or allergic to peanuts or other legumes or beans. Taken as directed, it may cause a faint maple body odor. That is to be expected and means that the herb is doing it's job.   --Torbangun:  a leaf used in [place] " for helping with milk supply.  A few studies have found this to be helpful.  Several companies sell this in compounds for increasing mother's milk.  --Shatavari:  a type of asparagus found in [place], also found to help with milk supply.  Available in several compounds made by different companies.  --Oatmeal:  try a bowl daily.  Barley and quinoa have also been found to be helpful.  --Calcium supplement:  this seems to be particularly helpful for those women who note a decrease in milk supply around their menstrual cycles.  Take 1500 mg of calcium with 750 mg of magnesium daily from midcycle through your period.  --Blessed thistle, goat's rue, or other herbs or beverages such as Mother's Milk Tea taken as directed on the package. Reliable sources of herbs and herbal blends are Motherlove Herbals, Dorys Herbs and Legendairy Milk.  --Lactation cookies:  these are very expensive (often around $20 for one package of mix) and many do not contain anything more special than oatmeal, flaxseed and mckeon's yeast, along with sugar and chocolate.  Probably not really worth the money.    --Keep records  --It is important to keep a daily log with the number of pumping sessions, amount obtained amount you are having to supplement your baby and 24 hour totals, this amount is more important that the pumped amount at each session. This will help you see your progress over the days.   --Keep in touch with your health care provider so he/she can monitor your progress over the days and modify advice if necessary.     Retained placenta  If you are not seeing improvement and you are having any heavy bleeding, discuss the possibility of retained placental fragments with your MD. Small bits of the placenta can secrete enough hormones to prevent the milk from coming in.    Low thyroid  Have you health care provider check your thyroid levels. Low thyroid can affect milk supply. If you have been taking thyroid medication, have your levels  checked after delivery, you may need your medication adjusted.     Other resources: http://www.lowmilksupply.org    Medicine Bow Video demonstrating hand expression (demo begins at about minute 2:00)  [email or web address]    Medicine Bow Maximizing Milk Production Video:  [email or web address]-[email or web address]      __________    For an excellent video on paced bottle feeding:  [email or web address]-[email or web address]-[email or web address]

## 2022-09-08 ENCOUNTER — TRANSFERRED RECORDS (OUTPATIENT)
Dept: HEALTH INFORMATION MANAGEMENT | Facility: CLINIC | Age: 22
End: 2022-09-08

## 2023-04-13 ENCOUNTER — MEDICAL CORRESPONDENCE (OUTPATIENT)
Dept: HEALTH INFORMATION MANAGEMENT | Facility: CLINIC | Age: 23
End: 2023-04-13
Payer: COMMERCIAL

## 2023-04-16 ENCOUNTER — LAB (OUTPATIENT)
Dept: LAB | Facility: CLINIC | Age: 23
End: 2023-04-16
Attending: FAMILY MEDICINE
Payer: COMMERCIAL

## 2023-04-16 DIAGNOSIS — O26.649 CHOLESTASIS DURING PREGNANCY: Primary | ICD-10-CM

## 2023-04-16 LAB
HOLD SPECIMEN: NORMAL

## 2023-04-16 PROCEDURE — 84450 TRANSFERASE (AST) (SGOT): CPT

## 2023-04-16 PROCEDURE — 84460 ALANINE AMINO (ALT) (SGPT): CPT

## 2023-04-16 PROCEDURE — 36415 COLL VENOUS BLD VENIPUNCTURE: CPT

## 2023-04-16 PROCEDURE — 82239 BILE ACIDS TOTAL: CPT

## 2023-04-16 PROCEDURE — 83789 MASS SPECTROMETRY QUAL/QUAN: CPT

## 2023-04-18 DIAGNOSIS — O26.649 CHOLESTASIS DURING PREGNANCY, ANTEPARTUM: Primary | ICD-10-CM

## 2023-04-18 LAB
ALT SERPL W P-5'-P-CCNC: 16 U/L (ref 10–35)
AST SERPL W P-5'-P-CCNC: 31 U/L (ref 10–35)

## 2023-04-21 LAB
BILE AC SERPL-SCNC: 3.2 UMOL/L
CDCAE SERPL-SCNC: 1.6 UMOL/L
CHOLATE SERPL-SCNC: 0.4 UMOL/L
DO-CHOLATE SERPL-SCNC: 0.9 UMOL/L
URSODEOXYCHOLATE SERPL-SCNC: 0.3 UMOL/L

## 2023-05-04 ENCOUNTER — TRANSFERRED RECORDS (OUTPATIENT)
Dept: HEALTH INFORMATION MANAGEMENT | Facility: CLINIC | Age: 23
End: 2023-05-04
Payer: COMMERCIAL

## 2023-05-09 ENCOUNTER — LAB (OUTPATIENT)
Dept: LAB | Facility: CLINIC | Age: 23
End: 2023-05-09
Payer: COMMERCIAL

## 2023-05-09 DIAGNOSIS — O26.649 CHOLESTASIS DURING PREGNANCY, ANTEPARTUM: ICD-10-CM

## 2023-05-09 PROCEDURE — 83789 MASS SPECTROMETRY QUAL/QUAN: CPT | Mod: 90

## 2023-05-09 PROCEDURE — 84460 ALANINE AMINO (ALT) (SGPT): CPT

## 2023-05-09 PROCEDURE — 36415 COLL VENOUS BLD VENIPUNCTURE: CPT

## 2023-05-09 PROCEDURE — 84450 TRANSFERASE (AST) (SGOT): CPT

## 2023-05-09 PROCEDURE — 99000 SPECIMEN HANDLING OFFICE-LAB: CPT

## 2023-05-10 LAB
ALT SERPL W P-5'-P-CCNC: 13 U/L (ref 0–50)
AST SERPL W P-5'-P-CCNC: 14 U/L (ref 0–45)

## 2023-05-14 ENCOUNTER — HEALTH MAINTENANCE LETTER (OUTPATIENT)
Age: 23
End: 2023-05-14

## 2023-05-15 LAB
BILE AC SERPL-SCNC: 3.7 UMOL/L
CDCAE SERPL-SCNC: 0.8 UMOL/L
CHOLATE SERPL-SCNC: 0.3 UMOL/L
DO-CHOLATE SERPL-SCNC: 0.6 UMOL/L
URSODEOXYCHOLATE SERPL-SCNC: 2 UMOL/L

## 2023-05-16 ENCOUNTER — LAB (OUTPATIENT)
Dept: LAB | Facility: CLINIC | Age: 23
End: 2023-05-16
Payer: COMMERCIAL

## 2023-05-16 DIAGNOSIS — O26.649 CHOLESTASIS DURING PREGNANCY, ANTEPARTUM: ICD-10-CM

## 2023-05-16 PROCEDURE — 83789 MASS SPECTROMETRY QUAL/QUAN: CPT | Mod: 90

## 2023-05-16 PROCEDURE — 84450 TRANSFERASE (AST) (SGOT): CPT

## 2023-05-16 PROCEDURE — 36415 COLL VENOUS BLD VENIPUNCTURE: CPT

## 2023-05-16 PROCEDURE — 84460 ALANINE AMINO (ALT) (SGPT): CPT

## 2023-05-16 PROCEDURE — 99000 SPECIMEN HANDLING OFFICE-LAB: CPT

## 2023-05-17 LAB
ALT SERPL W P-5'-P-CCNC: 11 U/L (ref 10–35)
AST SERPL W P-5'-P-CCNC: 18 U/L (ref 10–35)

## 2023-05-22 LAB
BILE AC SERPL-SCNC: 5.1 UMOL/L
CDCAE SERPL-SCNC: 1 UMOL/L
CHOLATE SERPL-SCNC: 0.3 UMOL/L
DO-CHOLATE SERPL-SCNC: 0.7 UMOL/L
URSODEOXYCHOLATE SERPL-SCNC: 3.1 UMOL/L

## 2023-05-26 ENCOUNTER — ANESTHESIA EVENT (OUTPATIENT)
Dept: OBGYN | Facility: HOSPITAL | Age: 23
End: 2023-05-26
Payer: COMMERCIAL

## 2023-05-26 ENCOUNTER — ANESTHESIA (OUTPATIENT)
Dept: OBGYN | Facility: HOSPITAL | Age: 23
End: 2023-05-26
Payer: COMMERCIAL

## 2023-05-26 PROBLEM — O98.919: Status: ACTIVE | Noted: 2023-05-26

## 2023-05-26 PROCEDURE — 250N000011 HC RX IP 250 OP 636: Performed by: ANESTHESIOLOGY

## 2023-05-26 PROCEDURE — 370N000003 HC ANESTHESIA WARD SERVICE: Performed by: ANESTHESIOLOGY

## 2023-05-26 RX ADMIN — BUPIVACAINE HYDROCHLORIDE 10 ML: 2.5 INJECTION, SOLUTION EPIDURAL; INFILTRATION; INTRACAUDAL at 21:35

## 2023-05-26 NOTE — ANESTHESIA PREPROCEDURE EVALUATION
Anesthesia Pre-Procedure Evaluation    Patient: Samaria Lowe   MRN: [id number] : 2000        Procedure : * No procedures listed *  Induction       History reviewed. No pertinent past medical history.   History reviewed. No pertinent surgical history.   Allergies   Allergen Reactions     Thimerosal Rash      Social History     Tobacco Use     Smoking status: Never     Smokeless tobacco: Never   Vaping Use     Vaping status: Not on file   Substance Use Topics     Alcohol use: Not Currently      Wt Readings from Last 1 Encounters:   23 106.6 kg (235 lb)        Anesthesia Evaluation   Pt has had prior anesthetic. Type: Regional.        ROS/MED HX  ENT/Pulmonary:  - neg pulmonary ROS     Neurologic:  - neg neurologic ROS     Cardiovascular:  - neg cardiovascular ROS     METS/Exercise Tolerance:     Hematologic:  - neg hematologic  ROS     Musculoskeletal:  - neg musculoskeletal ROS     GI/Hepatic:  - neg GI/hepatic ROS     Renal/Genitourinary:  - neg Renal ROS     Endo:     (+) Obesity,     Psychiatric/Substance Use:     (+) psychiatric history depression     Infectious Disease:  - neg infectious disease ROS     Malignancy:       Other:            Physical Exam    Airway  airway exam normal           Respiratory Devices and Support         Dental       (+) Minor Abnormalities - some fillings, tiny chips      Cardiovascular   cardiovascular exam normal          Pulmonary   pulmonary exam normal                OUTSIDE LABS:  CBC:   Lab Results   Component Value Date    HGB 14.5 2021     BMP: No results found for: NA, POTASSIUM, CHLORIDE, CO2, BUN, CR, GLC  COAGS: No results found for: PTT, INR, FIBR  POC: No results found for: BGM, HCG, HCGS  HEPATIC:   Lab Results   Component Value Date    ALT 11 2023    AST 18 2023     OTHER: No results found for: PH, LACT, A1C, ALIYA, PHOS, MAG, LIPASE, AMYLASE, TSH, T4, T3, CRP, SED    Anesthesia Plan    ASA Status:  2      Anesthesia Type: Epidural.               Consents    Anesthesia Plan(s) and associated risks, benefits, and realistic alternatives discussed. Questions answered and patient/representative(s) expressed understanding.    - Discussed:     - Discussed with:  Patient, Spouse         Postoperative Care    Pain management: IV analgesics, Oral pain medications.   PONV prophylaxis: Ondansetron (or other 5HT-3)     Comments:                Hansel Live MD

## 2023-05-26 NOTE — ANESTHESIA PROCEDURE NOTES
"Epidural catheter Procedure Note    Pre-Procedure   Staff -        Anesthesiologist:  Hansel Live MD       Performed By: anesthesiologist       Location: OB       Procedure Start/Stop Times: 5/26/2023 4:18 PM and 5/26/2023 4:35 PM       Pre-Anesthestic Checklist: patient identified, IV checked, risks and benefits discussed, informed consent, monitors and equipment checked, pre-op evaluation, at physician/surgeon's request and post-op pain management  Timeout:       Correct Patient: Yes        Correct Procedure: Yes        Correct Site: Yes        Correct Position: Yes   Procedure Documentation  Procedure: epidural catheter       Diagnosis: IUP       Patient Position: sitting       Patient Prep/Sterile Barriers: sterile gloves, mask, patient draped       Skin prep: Chloraprep       Insertion Site: L1-2. (midline approach).       Technique: LORT air        LIZ at 5 cm.       Needle Type: StartForce       Needle Gauge: 18.        Needle Length (Inches): 3.5        Catheter: 20 G.          Catheter threaded easily.         7 cm epidural space.         Threaded 12 cm at skin.      Assessment/Narrative         Paresthesias: No.       Test dose of 3 mL lidocaine 1.5% w/ 1:200,000 epinephrine at 16:30 CDT.         Test dose negative, 3 minutes after injection, for signs of intravascular, subdural, or intrathecal injection.       Insertion/Infusion Method: LORT air       Aspiration negative for Heme or CSF via Epidural Catheter.    Medication(s) Administered   Medication Administration Time: 5/26/2023 4:18 PM      FOR [facility] ONLY:   Pain Team Contact information: please page the Pain Team Via SmartCare system. Search \"Pain\". During daytime hours, please page the attending first. At night please page the resident first.      "

## 2023-05-27 RX ORDER — BUPIVACAINE HYDROCHLORIDE 2.5 MG/ML
INJECTION, SOLUTION EPIDURAL; INFILTRATION; INTRACAUDAL PRN
Status: DISCONTINUED | OUTPATIENT
Start: 2023-05-26 | End: 2023-05-27

## 2023-05-27 NOTE — ANESTHESIA POSTPROCEDURE EVALUATION
Patient: Samaria E Capistrant    Procedure: * No procedures listed *  Induction    Anesthesia Type:  Epidural    Note:  Disposition: Inpatient   Postop Pain Control: Uneventful            Sign Out: Well controlled pain   PONV: No   Neuro/Psych: Uneventful            Sign Out: Acceptable/Baseline neuro status   Airway/Respiratory: Uneventful            Sign Out: Acceptable/Baseline resp. status   CV/Hemodynamics: Uneventful            Sign Out: Acceptable CV status; No obvious hypovolemia; No obvious fluid overload   Other NRE: NONE   DID A NON-ROUTINE EVENT OCCUR? No           Last vitals:  Vitals:    05/27/23 0015 05/27/23 0030 05/27/23 0421   BP: 131/69 124/60 129/76   Pulse:  90 85   Resp:  18 16   Temp:  37.1  C (98.8  F) 36.7  C (98  F)   SpO2: 98% 99% 97%       Electronically Signed By: Garrison Steve MD  May 27, 2023  9:16 AM

## 2024-05-18 ENCOUNTER — HEALTH MAINTENANCE LETTER (OUTPATIENT)
Age: 24
End: 2024-05-18

## 2024-06-06 NOTE — PROGRESS NOTES
June 6, 2024      [facility]  [address]  [place] [address]  Phone: [phone]  Fax: [phone]       Patient: Norma Blue   YOB: 1994  Date of Visit: 06/06/2024    To Whom It May Concern:    Elizabeth Blue  was at [facility] on 06/06/2024. The patient may return to work/school on 06/07/2024 with no restrictions. If you have any questions or concerns, or if I can be of further assistance, please do not hesitate to contact me.    Sincerely,    NOEMI Barakat      "SUBJECTIVE:   She is more uncomfortable with contractions and was nauseated when she tried to eat breakfast.  Small bloody show overnight.    OBJECTIVE:  /61   Pulse 88   Temp 98.3  F (36.8  C)   Resp 18   Ht 1.676 m (5' 6\")   Wt 99.8 kg (220 lb)   SpO2 98%   Breastfeeding Yes   BMI 35.51 kg/m    Alert, mildly in distress with contractions, but able to talk through  Cervix: 3/90%/0 bulging membranes  Electronic Fetal Monitoring:   Baseline rate 140's, normal  Variability moderate  Accelerations present  Decelerations not present    Uterine Activity every 1.5 - 2 minutes x 45 seconds      Strip reviewed at bedside     ASSESSMENT: Early Labor    PLAN:   Stop Cervidil.    Start Pitocin if augmentation needed.    AROM offered but she declines.    Start GBS prophylaxis.  Prepare for epidural which she may have as soon as ready.  "

## 2025-03-10 ENCOUNTER — OFFICE VISIT (OUTPATIENT)
Dept: URGENT CARE | Facility: URGENT CARE | Age: 25
End: 2025-03-10

## 2025-03-10 VITALS
BODY MASS INDEX: 38.58 KG/M2 | HEART RATE: 120 BPM | SYSTOLIC BLOOD PRESSURE: 117 MMHG | WEIGHT: 239 LBS | OXYGEN SATURATION: 97 % | TEMPERATURE: 100.7 F | DIASTOLIC BLOOD PRESSURE: 76 MMHG | RESPIRATION RATE: 20 BRPM

## 2025-03-10 DIAGNOSIS — J02.9 SORE THROAT: Primary | ICD-10-CM

## 2025-03-10 DIAGNOSIS — R50.9 FEVER, UNSPECIFIED FEVER CAUSE: ICD-10-CM

## 2025-03-10 LAB
DEPRECATED S PYO AG THROAT QL EIA: NEGATIVE
FLUAV AG SPEC QL IA: NEGATIVE
FLUBV AG SPEC QL IA: NEGATIVE

## 2025-03-10 PROCEDURE — 87651 STREP A DNA AMP PROBE: CPT

## 2025-03-10 PROCEDURE — 87804 INFLUENZA ASSAY W/OPTIC: CPT

## 2025-03-10 PROCEDURE — 99203 OFFICE O/P NEW LOW 30 MIN: CPT

## 2025-03-10 RX ORDER — PROGESTERONE 200 MG/1
CAPSULE ORAL
COMMUNITY
Start: 2025-03-04

## 2025-03-10 NOTE — PROGRESS NOTES
Patient presents with:  Fever: Fever, cough, chest congestion, mucus, shortness of breath, headaches, dizziness, sore throat, loss of appetite. Sx 3-4 days. Daughter has strep      Clinical Decision Making:      ICD-10-CM    1. Sore throat  J02.9       2. Fever, unspecified fever cause  R50.9 Streptococcus A Rapid Screen w/Reflex to PCR - Clinic Collect     Influenza A & B Antigen - Clinic Collect     Group A Streptococcus PCR Throat Swab        Rapid strep test and influenza negative, PCR pending.  Patient is otherwise well-appearing and in no signs of acute respiratory distress today in clinic. Tolerating fluids and secretions okay.  We did discuss possibly treating given recent strep exposure and being pregnant, though shared decision making was made to wait for PCR test as rapid test was negative and she recently finished a course of amoxicillin 2 to 3 weeks ago for group B strep in her urine.  Recommend Tylenol, Mucinex, and increased rest/fluids for symptom relief.  Advise close follow-up if symptoms do not improve.  Denies any vaginal bleeding or cramping. Patient instructions as below. Discussed red flag symptoms for when to return.       Patient Instructions   Your rapid strep test and influenza are negative. We will wait for the confirmatory culture if treatment is needed since you recently did a round antibiotics for group B strep. Things that may be helpful for treatment:     1) Increase fluids and rest  2) Try Mucinex and Neti pot over the counter for congestion  3) Continue taking Tylenol for fever/pain relief as needed.  4) Salt water gargles and lozenges can be helpful for throat relief  5) Honey may be helpful for your cough  6) You will only be notified of the confirmatory strep results if they are positive.       HPI:  Samaria Lowe is a 24 year old female who presents today with concerns of headache, dizziness, fever, cough, congestion, and sore throat. Symptoms started 2-3 days ago. Her  daughter recently tested positive for strep. Cough is mostly dry. Congestion is in head not chest. Has not taken any ibuprofen or tylenol. Throat pain worse with swallowing. Decreaed appetite, but still tolerating fluids ok. She is pregnant and has been struggling with morning sickness so she has had nausea and vomiting from that but no new nausea or vomiting. No vaginal bleeding or cramping.     History obtained from the patient.    Problem List:  2023-05: Pregnancy and infectious disease, unspecified trimester  2021-09: Postpartum depression  2021-09: Normal delivery  2021-09: Tachycardia  2021-09: GBS (group B Streptococcus carrier), +RV culture, currently   pregnant  2021-09: Encounter for induction of labor  2021-09: Cholestasis during pregnancy in third trimester      No past medical history on file.    Social History     Tobacco Use    Smoking status: Never    Smokeless tobacco: Never   Substance Use Topics    Alcohol use: Not Currently       ROS is negative other than what is noted in HPI.       Vitals:    03/10/25 1832   BP: 117/76   BP Location: Left arm   Cuff Size: Adult Large   Pulse: 120   Resp: 20   Temp: 100.7  F (38.2  C)   TempSrc: Tympanic   SpO2: 97%   Weight: 108.4 kg (239 lb)       Physical Exam  Constitutional:       General: She is not in acute distress.     Appearance: She is not diaphoretic.   HENT:      Head: Normocephalic and atraumatic.      Right Ear: Tympanic membrane and external ear normal.      Left Ear: Tympanic membrane and external ear normal.      Mouth/Throat:      Pharynx: Uvula midline. Posterior oropharyngeal erythema present. No oropharyngeal exudate.      Tonsils: No tonsillar exudate or tonsillar abscesses. 1+ on the right.   Eyes:      Conjunctiva/sclera: Conjunctivae normal.   Cardiovascular:      Rate and Rhythm: Normal rate and regular rhythm.      Heart sounds: Normal heart sounds. No murmur heard.  Pulmonary:      Effort: Pulmonary effort is normal. No respiratory  distress.      Breath sounds: Normal breath sounds.   Musculoskeletal:         General: Normal range of motion.   Skin:     General: Skin is warm.   Neurological:      General: No focal deficit present.      Mental Status: She is alert.   Psychiatric:         Mood and Affect: Mood normal.         Thought Content: Thought content normal.         Judgment: Judgment normal.         Results:  Results for orders placed or performed in visit on 03/10/25   Streptococcus A Rapid Screen w/Reflex to PCR - Clinic Collect     Status: Normal    Specimen: Throat; Swab   Result Value Ref Range    Group A Strep antigen Negative Negative   Influenza A & B Antigen - Clinic Collect     Status: Normal    Specimen: Nose; Swab   Result Value Ref Range    Influenza A antigen Negative Negative    Influenza B antigen Negative Negative    Narrative    Test results must be correlated with clinical data. If necessary, results should be confirmed by a molecular assay or viral culture.         At the end of the encounter, I discussed results, diagnosis, medications. Discussed red flags for immediate return to clinic/ER, as well as indications for follow up if no improvement. Patient understood and agreed to plan. Patient was stable for discharge.    SUKHDEV Owens [facility]

## 2025-03-11 LAB — S PYO DNA THROAT QL NAA+PROBE: NOT DETECTED

## 2025-03-11 NOTE — PATIENT INSTRUCTIONS
Your rapid strep test and influenza are negative. We will wait for the confirmatory culture if treatment is needed since you recently did a round antibiotics for group B strep. Things that may be helpful for treatment:     1) Increase fluids and rest  2) Try Mucinex and Neti pot over the counter for congestion  3) Continue taking Tylenol for fever/pain relief as needed.  4) Salt water gargles and lozenges can be helpful for throat relief  5) Honey may be helpful for your cough  6) You will only be notified of the confirmatory strep results if they are positive.

## 2025-06-08 ENCOUNTER — HEALTH MAINTENANCE LETTER (OUTPATIENT)
Age: 25
End: 2025-06-08

## 2025-07-26 ENCOUNTER — ANESTHESIA (OUTPATIENT)
Dept: OBGYN | Facility: HOSPITAL | Age: 25
End: 2025-07-26
Payer: COMMERCIAL

## 2025-07-26 ENCOUNTER — ANESTHESIA EVENT (OUTPATIENT)
Dept: OBGYN | Facility: HOSPITAL | Age: 25
End: 2025-07-26
Payer: COMMERCIAL

## 2025-07-26 PROBLEM — O98.919: Status: RESOLVED | Noted: 2023-05-26 | Resolved: 2025-07-26

## 2025-07-26 PROBLEM — O26.649 INTRAHEPATIC CHOLESTASIS OF PREGNANCY: Status: ACTIVE | Noted: 2025-07-26

## 2025-07-26 PROCEDURE — 250N000011 HC RX IP 250 OP 636: Performed by: PAIN MEDICINE

## 2025-07-26 PROCEDURE — 370N000003 HC ANESTHESIA WARD SERVICE: Performed by: PAIN MEDICINE

## 2025-07-26 RX ADMIN — LIDOCAINE HYDROCHLORIDE 5 ML: 20 INJECTION, SOLUTION EPIDURAL; INFILTRATION; INTRACAUDAL; PERINEURAL at 12:20

## 2025-07-26 NOTE — ANESTHESIA PROCEDURE NOTES
"Epidural catheter Procedure Note    Pre-Procedure   Staff -        Anesthesiologist:  Deyanira Siddiqi MD       Performed By: anesthesiologist       Location: OB       Procedure Start/Stop Times: 7/26/2025 12:12 PM and 7/26/2025 12:22 PM       Pre-Anesthestic Checklist: patient identified, IV checked, risks and benefits discussed, informed consent, monitors and equipment checked, pre-op evaluation, at physician/surgeon's request and post-op pain management  Timeout:       Correct Patient: Yes        Correct Procedure: Yes        Correct Site: Yes        Correct Position: Yes   Procedure Documentation  Procedure: epidural catheter         Patient Position: sitting       Patient Prep/Sterile Barriers: sterile gloves, mask, patient draped       Skin prep: Chloraprep       Local skin infiltrated with 3 mL of 1% lidocaine.        Insertion Site: L4-5. (midline approach).       Technique: LORT air        LIZ at 8 cm.       Needle Type: Touhy needle       Needle Gauge: 17.        Needle Length (Inches): 3.5        Catheter: 19 G.          Catheter threaded easily.           Threaded 15 cm at skin.         # of attempts: 1 and  # of redirects:  0    Assessment/Narrative         Paresthesias: No.       Test dose of 3 mL lidocaine 1.5% w/ 1:200,000 epinephrine at 12:17 CDT.         Test dose negative, 3 minutes after injection, for signs of intravascular, subdural, or intrathecal injection.       Insertion/Infusion Method: LORT air       Aspiration negative for Heme or CSF via Epidural Catheter.    Medication(s) Administered   Lidocaine 2% (Epidural) - EPIDURAL   5 mL - 7/26/2025 12:20:00 PM  Medication Administration Time: 7/26/2025 12:12 PM      FOR [facility] ([facility]) ONLY:   Pain Team Contact information: please page the Pain Team Via [employer]. Search \"Pain\". During daytime hours, please page the attending first. At night please page the resident first.      "

## 2025-07-26 NOTE — ANESTHESIA PREPROCEDURE EVALUATION
"Anesthesia Pre-Procedure Evaluation    Patient: Samaria Lowe   MRN: [id number] : 2000          Procedure :   Induction      Past Medical History:   Diagnosis Date    Postpartum depression       History reviewed. No pertinent surgical history.   Allergies   Allergen Reactions    Thimerosal (Thiomersal) Rash      Social History     Tobacco Use    Smoking status: Never    Smokeless tobacco: Never   Substance Use Topics    Alcohol use: Not Currently      Wt Readings from Last 1 Encounters:   25 115.1 kg (253 lb 11.2 oz)        Anesthesia Evaluation        No history of anesthetic complications       ROS/MED HX  ENT/Pulmonary:  - neg pulmonary ROS     Neurologic:  - neg neurologic ROS     Cardiovascular:  - neg cardiovascular ROS     METS/Exercise Tolerance:     Hematologic:       Musculoskeletal:       GI/Hepatic:     (+)                cholestasis    Renal/Genitourinary:       Endo:     (+)               Obesity,       Psychiatric/Substance Use:     (+) psychiatric history depression       Infectious Disease:       Malignancy:       Other:     (-) previous  and TOLAC candidate         Physical Exam  Airway  Mallampati: III  TM distance: > 3 FB  Neck ROM: full    Cardiovascular - normal exam   Dental - normal exam    Pulmonary       Neurological   Other Findings       OUTSIDE LABS:  CBC:   Lab Results   Component Value Date    WBC 12.6 (H) 2025    HGB 13.5 2025    HGB 14.5 2021    HCT 40.2 2025     2025     BMP:   Lab Results   Component Value Date     2025    POTASSIUM 4.0 2025    CHLORIDE 104 2025    CO2 21 (L) 2025    BUN 7.5 2025    CR 0.55 2025    GLC 97 2025     COAGS: No results found for: \"PTT\", \"INR\", \"FIBR\"  POC: No results found for: \"BGM\", \"HCG\", \"HCGS\"  HEPATIC:   Lab Results   Component Value Date    ALBUMIN 3.7 2025    PROTTOTAL 6.6 2025    ALT 10 2025    AST 13 2025    " ALKPHOS 173 (H) 07/25/2025    BILITOTAL 0.2 07/25/2025     OTHER:   Lab Results   Component Value Date    ALIYA 9.7 07/25/2025       Anesthesia Plan    ASA Status:  3       Anesthesia Type: Epidural.        Consents    Anesthesia Plan(s) and associated risks, benefits, and realistic alternatives discussed. Questions answered and patient/representative(s) expressed understanding.     - Discussed:     - Discussed with:  Patient               Postoperative Care         Comments:               neg OB ROS.      Deyanira Siddiqi MD    I have reviewed the pertinent notes and labs in the chart from the past 30 days and (re)examined the patient.  Any updates or changes from those notes are reflected in this note.    Clinically Significant Risk Factors

## 2025-07-27 NOTE — ANESTHESIA POSTPROCEDURE EVALUATION
Patient: Samaria E Capistrant    Procedure: * No procedures listed *  Induction    Anesthesia Type:  Epidural    Note:  Disposition: Inpatient   Postop Pain Control: Uneventful            Sign Out: Well controlled pain   PONV: No   Neuro/Psych: Uneventful            Sign Out: Acceptable/Baseline neuro status   Airway/Respiratory: Uneventful            Sign Out: Acceptable/Baseline resp. status   CV/Hemodynamics: Uneventful            Sign Out: Acceptable CV status; No obvious hypovolemia; No obvious fluid overload   Other NRE: NONE   DID A NON-ROUTINE EVENT OCCUR? No           Last vitals:  Vitals:    07/26/25 1923 07/26/25 1937 07/26/25 1952   BP: 139/67 139/67 134/63   Pulse:      Resp:      Temp:      SpO2:          Electronically Signed By: Deyanira Siddiqi MD  July 26, 2025  8:43 PM